# Patient Record
Sex: FEMALE | Race: WHITE | Employment: UNEMPLOYED | URBAN - METROPOLITAN AREA
[De-identification: names, ages, dates, MRNs, and addresses within clinical notes are randomized per-mention and may not be internally consistent; named-entity substitution may affect disease eponyms.]

---

## 2017-05-07 ENCOUNTER — HOSPITAL ENCOUNTER (EMERGENCY)
Age: 42
Discharge: HOME | End: 2017-05-07
Attending: EMERGENCY MEDICINE
Payer: MEDICARE

## 2017-05-07 VITALS
OXYGEN SATURATION: 97 % | SYSTOLIC BLOOD PRESSURE: 149 MMHG | HEART RATE: 76 BPM | BODY MASS INDEX: 38.17 KG/M2 | DIASTOLIC BLOOD PRESSURE: 84 MMHG | RESPIRATION RATE: 16 BRPM | HEIGHT: 67 IN | TEMPERATURE: 97.9 F | WEIGHT: 243.19 LBS

## 2017-05-07 DIAGNOSIS — S01.01XA SCALP LACERATION, INITIAL ENCOUNTER: Primary | ICD-10-CM

## 2017-05-07 PROCEDURE — 99283 EMERGENCY DEPT VISIT LOW MDM: CPT

## 2017-05-07 PROCEDURE — 6370000000 HC RX 637 (ALT 250 FOR IP): Performed by: EMERGENCY MEDICINE

## 2017-05-07 RX ORDER — TIZANIDINE 4 MG/1
4 TABLET ORAL EVERY 6 HOURS PRN
COMMUNITY
End: 2022-07-01

## 2017-05-07 RX ADMIN — Medication: at 15:29

## 2017-05-07 ASSESSMENT — ENCOUNTER SYMPTOMS
COLOR CHANGE: 0
EYE REDNESS: 0
COUGH: 0
EYE DISCHARGE: 0
ABDOMINAL PAIN: 0
FACIAL SWELLING: 0
VOMITING: 0
SHORTNESS OF BREATH: 0
DIARRHEA: 0
CONSTIPATION: 0

## 2017-05-07 ASSESSMENT — PAIN DESCRIPTION - LOCATION: LOCATION: HEAD

## 2017-05-07 ASSESSMENT — PAIN DESCRIPTION - PAIN TYPE: TYPE: ACUTE PAIN

## 2017-05-07 ASSESSMENT — PAIN SCALES - GENERAL: PAINLEVEL_OUTOF10: 1

## 2017-05-09 ENCOUNTER — HOSPITAL ENCOUNTER (EMERGENCY)
Age: 42
Discharge: HOME | End: 2017-05-09
Attending: EMERGENCY MEDICINE
Payer: MEDICARE

## 2017-05-09 ENCOUNTER — APPOINTMENT (OUTPATIENT)
Dept: CT IMAGING | Age: 42
End: 2017-05-09
Payer: MEDICARE

## 2017-05-09 VITALS
BODY MASS INDEX: 38.14 KG/M2 | HEIGHT: 67 IN | OXYGEN SATURATION: 96 % | RESPIRATION RATE: 16 BRPM | WEIGHT: 243 LBS | HEART RATE: 73 BPM | TEMPERATURE: 98.1 F | SYSTOLIC BLOOD PRESSURE: 141 MMHG | DIASTOLIC BLOOD PRESSURE: 88 MMHG

## 2017-05-09 DIAGNOSIS — R42 DIZZINESS: Primary | ICD-10-CM

## 2017-05-09 PROCEDURE — 70450 CT HEAD/BRAIN W/O DYE: CPT

## 2017-05-09 PROCEDURE — 99284 EMERGENCY DEPT VISIT MOD MDM: CPT

## 2017-05-09 ASSESSMENT — ENCOUNTER SYMPTOMS
VOMITING: 0
CONSTIPATION: 0
COLOR CHANGE: 0
EYE DISCHARGE: 0
DIARRHEA: 0
EYE REDNESS: 0
FACIAL SWELLING: 0
ABDOMINAL PAIN: 0
SHORTNESS OF BREATH: 0
COUGH: 0

## 2022-07-01 ENCOUNTER — APPOINTMENT (OUTPATIENT)
Dept: CT IMAGING | Age: 47
End: 2022-07-01
Payer: COMMERCIAL

## 2022-07-01 ENCOUNTER — HOSPITAL ENCOUNTER (EMERGENCY)
Age: 47
Discharge: HOME | End: 2022-07-01
Attending: EMERGENCY MEDICINE
Payer: COMMERCIAL

## 2022-07-01 VITALS
RESPIRATION RATE: 14 BRPM | HEART RATE: 80 BPM | OXYGEN SATURATION: 96 % | DIASTOLIC BLOOD PRESSURE: 94 MMHG | HEIGHT: 67 IN | WEIGHT: 250 LBS | SYSTOLIC BLOOD PRESSURE: 149 MMHG | BODY MASS INDEX: 39.24 KG/M2 | TEMPERATURE: 98.2 F

## 2022-07-01 DIAGNOSIS — G35 MULTIPLE SCLEROSIS EXACERBATION (HCC): Primary | ICD-10-CM

## 2022-07-01 LAB
ABSOLUTE EOS #: 0.1 K/UL (ref 0–0.4)
ABSOLUTE LYMPH #: 1.3 K/UL (ref 1–4.8)
ABSOLUTE MONO #: 0.5 K/UL (ref 0.1–1.2)
ALBUMIN SERPL-MCNC: 4.8 G/DL (ref 3.5–5.2)
ALBUMIN/GLOBULIN RATIO: 1.9 (ref 1–2.5)
ALP BLD-CCNC: 81 U/L (ref 35–104)
ALT SERPL-CCNC: 18 U/L (ref 5–33)
ANION GAP SERPL CALCULATED.3IONS-SCNC: 12 MMOL/L (ref 9–17)
AST SERPL-CCNC: 15 U/L
BASOPHILS # BLD: 1 % (ref 0–2)
BASOPHILS ABSOLUTE: 0 K/UL (ref 0–0.2)
BILIRUB SERPL-MCNC: 0.37 MG/DL (ref 0.3–1.2)
BILIRUBIN DIRECT: 0.09 MG/DL
BILIRUBIN, INDIRECT: 0.28 MG/DL (ref 0–1)
BUN BLDV-MCNC: 14 MG/DL (ref 6–20)
CALCIUM SERPL-MCNC: 9.5 MG/DL (ref 8.6–10.4)
CHLORIDE BLD-SCNC: 100 MMOL/L (ref 98–107)
CO2: 25 MMOL/L (ref 20–31)
CREAT SERPL-MCNC: 0.52 MG/DL (ref 0.5–0.9)
EOSINOPHILS RELATIVE PERCENT: 2 % (ref 1–4)
GFR AFRICAN AMERICAN: >60 ML/MIN
GFR NON-AFRICAN AMERICAN: >60 ML/MIN
GFR SERPL CREATININE-BSD FRML MDRD: ABNORMAL ML/MIN/{1.73_M2}
GLUCOSE BLD-MCNC: 105 MG/DL (ref 70–99)
HCT VFR BLD CALC: 35.9 % (ref 36–46)
HEMOGLOBIN: 11.8 G/DL (ref 12–16)
LYMPHOCYTES # BLD: 21 % (ref 24–44)
MCH RBC QN AUTO: 27.3 PG (ref 26–34)
MCHC RBC AUTO-ENTMCNC: 32.8 G/DL (ref 31–37)
MCV RBC AUTO: 83.5 FL (ref 80–100)
MONOCYTES # BLD: 8 % (ref 2–11)
PDW BLD-RTO: 16 % (ref 12.5–15.4)
PLATELET # BLD: 254 K/UL (ref 140–450)
PMV BLD AUTO: 7.9 FL (ref 6–12)
POTASSIUM SERPL-SCNC: 4.5 MMOL/L (ref 3.7–5.3)
RBC # BLD: 4.3 M/UL (ref 4–5.2)
SEG NEUTROPHILS: 68 % (ref 36–66)
SEGMENTED NEUTROPHILS ABSOLUTE COUNT: 4 K/UL (ref 1.8–7.7)
SODIUM BLD-SCNC: 137 MMOL/L (ref 135–144)
TOTAL PROTEIN: 7.3 G/DL (ref 6.4–8.3)
WBC # BLD: 6 K/UL (ref 3.5–11)

## 2022-07-01 PROCEDURE — 96374 THER/PROPH/DIAG INJ IV PUSH: CPT

## 2022-07-01 PROCEDURE — 85025 COMPLETE CBC W/AUTO DIFF WBC: CPT

## 2022-07-01 PROCEDURE — 36415 COLL VENOUS BLD VENIPUNCTURE: CPT

## 2022-07-01 PROCEDURE — 70450 CT HEAD/BRAIN W/O DYE: CPT

## 2022-07-01 PROCEDURE — 80048 BASIC METABOLIC PNL TOTAL CA: CPT

## 2022-07-01 PROCEDURE — 80076 HEPATIC FUNCTION PANEL: CPT

## 2022-07-01 PROCEDURE — 6360000002 HC RX W HCPCS: Performed by: EMERGENCY MEDICINE

## 2022-07-01 PROCEDURE — 99284 EMERGENCY DEPT VISIT MOD MDM: CPT

## 2022-07-01 RX ORDER — SERTRALINE HYDROCHLORIDE 100 MG/1
150 TABLET, FILM COATED ORAL DAILY
COMMUNITY
Start: 2022-06-08

## 2022-07-01 RX ORDER — LOSARTAN POTASSIUM 25 MG/1
25 TABLET ORAL DAILY
COMMUNITY

## 2022-07-01 RX ORDER — PREDNISONE 10 MG/1
TABLET ORAL
Qty: 48 EACH | Refills: 0 | Status: SHIPPED | OUTPATIENT
Start: 2022-07-01

## 2022-07-01 RX ORDER — BUSPIRONE HYDROCHLORIDE 15 MG/1
15 TABLET ORAL 3 TIMES DAILY
COMMUNITY
Start: 2022-05-18

## 2022-07-01 RX ORDER — DEXAMETHASONE SODIUM PHOSPHATE 4 MG/ML
4 INJECTION, SOLUTION INTRA-ARTICULAR; INTRALESIONAL; INTRAMUSCULAR; INTRAVENOUS; SOFT TISSUE ONCE
Status: COMPLETED | OUTPATIENT
Start: 2022-07-01 | End: 2022-07-01

## 2022-07-01 RX ORDER — OLANZAPINE AND SAMIDORPHAN L-MALATE 5; 10 MG/1; MG/1
5-10 TABLET, FILM COATED ORAL DAILY
COMMUNITY
Start: 2022-06-21

## 2022-07-01 RX ADMIN — DEXAMETHASONE SODIUM PHOSPHATE 4 MG: 4 INJECTION, SOLUTION INTRAMUSCULAR; INTRAVENOUS at 16:14

## 2022-07-01 ASSESSMENT — PAIN - FUNCTIONAL ASSESSMENT: PAIN_FUNCTIONAL_ASSESSMENT: NONE - DENIES PAIN

## 2022-07-01 NOTE — ED PROVIDER NOTES
[address] [facility]  [address] [address] [facility] [address] [facility] [address]  Phone: [phone]  Fax: [phone]        Pt Name: Litzy Hall  MRN: [id number]  [place] 1975  Date of evaluation: 7/1/22      CHIEF COMPLAINT     Chief Complaint   Patient presents with    Numbness     tingling of digits of left hand - left leg numb & dragging -numbness of face- per pt., normal presentation of MS    Dizziness     on-going being treated for vertigo          HISTORY OF PRESENT ILLNESS  (Location/Symptom, Timing/Onset, Context/Setting, Quality, Duration, Modifying Factors, Severity.)    Litzy Hall is a 50 y.o. female who presents with dizziness and numbness. The patient states she has a 20-year history of MS and she has had an MS flareup over the last several days where she has had dizziness numbness that is actually been going on for the last couple of weeks when this happened she normally requires steroids the patient denies any headache no visual or hearing changes no chest pain or shortness of breath no fever no chills no vomiting no diarrhea nothing she does makes her symptoms better or worse      REVIEW OF SYSTEMS    (2-9 systems for level 4, 10 or more for level 5)     Review of Systems   Neurological: Positive for dizziness and numbness. All other systems reviewed and are negative. PAST MEDICAL HISTORY    has a past medical history of Cardiac murmur, previously undiagnosed, Depression, Insomnia, Irregular menses, Migraines, Multiple sclerosis ([address] PMS (premenstrual syndrome), and Vertigo. SURGICAL HISTORY      has a past surgical history that includes Tubal ligation and Fill[place] tooth extraction. CURRENTMEDICATIONS       Previous Medications    BUSPIRONE (BUSPAR) 15 MG TABLET    Take 15 mg by mouth 3 times daily    GLATIRAMER (COPAXONE) 20 MG/ML INJECTION    Inject 40 mg into the skin three times a week.     LOSARTAN (COZAAR) 25 MG TABLET    Take 25 mg by mouth daily    LYBALVI 5-10 MG TABS    Take 5-10 mg by mouth daily    SERTRALINE (ZOLOFT) 100 MG TABLET    Take 150 mg by mouth daily       ALLERGIES     has No Known Allergies. FAMILY HISTORY     She indicated that her mother is alive. She indicated that her father is alive. family history includes Anxiety Disorder in her father and mother; Arthritis in her father; Coronary Art Dis in her father; Diabetes in her mother; High Blood Pressure in her father and mother. SOCIAL HISTORY      reports that she has quit smoking. Her smoking use included cigarettes. She has never used smokeless tobacco. She reports current alcohol use. She reports that she does not use drugs. PHYSICAL EXAM    (up to 7 for level 4, 8 or more for level 5)   INITIAL VITALS:  height is 5' 7\" (1.702 m) and weight is 113.4 kg (250 lb). Her oral temperature is 98.2 °F (36.8 °C). Her blood pressure is 154/95 (abnormal) and her pulse is 81. Her respiration is 12 and oxygen saturation is 97%. Physical Exam  Vitals and nursing note reviewed. Constitutional:       Appearance: Normal appearance. HENT:      Head: Normocephalic and atraumatic. Right Ear: Tympanic membrane normal.      Left Ear: Tympanic membrane normal.   Eyes:      Extraocular Movements: Extraocular movements intact. Conjunctiva/sclera: Conjunctivae normal.      Pupils: Pupils are equal, round, and reactive to light. Cardiovascular:      Rate and Rhythm: Normal rate and regular rhythm. Pulses: Normal pulses. Heart sounds: Normal heart sounds. Pulmonary:      Effort: Pulmonary effort is normal. No respiratory distress. Breath sounds: Normal breath sounds. No stridor. No wheezing, rhonchi or rales. Musculoskeletal:         General: No swelling or tenderness. Normal range of motion. Cervical back: Normal range of motion and neck supple. No rigidity or tenderness. Lymphadenopathy:      Cervical: No cervical adenopathy.    Skin:     General: Skin is warm and dry.      Findings: No rash. Neurological:      General: No focal deficit present. Mental Status: She is alert. Comments: Cranial nerves II through XII are grossly intact with no focal neurologic deficits appreciated no cerebellar deficits with a GCS of 15         DIFFERENTIAL DIAGNOSIS/ MDM:     We will go ahead and check basic labs CT of the head I will give the patient some IV steroids    DIAGNOSTIC RESULTS       RADIOLOGY:        Interpretation per the Radiologist below, if available at the time of this note:    [address] (Final result)  Result time 07/01/22 16:39:12  Final result by Ger Dozier MD (07/01/22 16:39:12)                Impression:    No evidence of acute intracranial process.  Mild atrophy and chronic small   vessel ischemic changes noted along with a small remote infarct of the high   right frontal lobe region. RECOMMENDATIONS:   Unavailable             Narrative:    EXAMINATION:   CT OF THE HEAD WITHOUT CONTRAST  7/1/2022 3:12 pm     TECHNIQUE:   CT of the head was performed without the administration of intravenous   contrast. Automated exposure control, iterative reconstruction, and/or weight   based adjustment of the mA/kV was utilized to reduce the radiation dose to as   low as reasonably achievable. COMPARISON:   May 9, 2017. HISTORY:   ORDERING SYSTEM PROVIDED HISTORY: dizziness   TECHNOLOGIST PROVIDED HISTORY:     dizziness   Decision Support Exception - unselect if not a suspected or confirmed   emergency medical condition->Emergency Medical Condition (MA)   Is the patient pregnant?->No   Reason for Exam: dizziness hx: MS     FINDINGS:   BRAIN/VENTRICLES: The gyri and sulci have a normal appearance.  There is mild   cortical and cerebellar volume loss with associated ex vacuo ventricular   dilation, greater than expected for age. Hulan Burleson diffuse periventricular and   subcortical deep white matter hypoattenuation noted, findings compatible with   chronic small vessel ischemic disease.  Remote infarct of the high right   frontal lobe region is unchanged.  The gray-white matter differentiation is   otherwise preserved throughout. Marcela Section is no acute hemorrhage, mass, or mass   effect.  No evidence of acute territorial infarct.  No abnormal extraaxial   fluid collections. ORBITS:  The visualized portion of the orbits demonstrate no acute   abnormality. SINUSES:  The mastoid air cells are normally aerated.  The visualized   paranasal sinuses are grossly clear. SOFT TISSUES/SKULL:  No significant abnormality of the visualized skull or   soft tissues. No acute fracture. No scalp hematoma.                    LABS:  Results for orders placed or performed during the hospital encounter of 07/01/22   CBC with Auto Differential   Result Value Ref Range    WBC 6.0 3.5 - 11.0 k/uL    RBC 4.30 4.0 - 5.2 m/uL    Hemoglobin 11.8 (L) 12.0 - 16.0 g/dL    Hematocrit 35.9 (L) 36 - 46 %    MCV 83.5 80 - 100 fL    MCH 27.3 26 - 34 pg    MCHC 32.8 31 - 37 g/dL    RDW 16.0 (H) 12.5 - 15.4 %    Platelets 145 168 - 914 k/uL    MPV 7.9 6.0 - 12.0 fL    Seg Neutrophils 68 (H) 36 - 66 %    Lymphocytes 21 (L) 24 - 44 %    Monocytes 8 2 - 11 %    Eosinophils % 2 1 - 4 %    Basophils 1 0 - 2 %    Segs Absolute 4.00 1.8 - 7.7 k/uL    Absolute Lymph # 1.30 1.0 - 4.8 k/uL    Absolute Mono # 0.50 0.1 - 1.2 k/uL    Absolute Eos # 0.10 0.0 - 0.4 k/uL    Basophils Absolute 0.00 0.0 - 0.2 k/uL   Basic Metabolic Panel   Result Value Ref Range    Glucose 105 (H) 70 - 99 mg/dL    BUN 14 6 - 20 mg/dL    CREATININE 0.52 0.50 - 0.90 mg/dL    Calcium 9.5 8.6 - 10.4 mg/dL    Sodium 137 135 - 144 mmol/L    Potassium 4.5 3.7 - 5.3 mmol/L    Chloride 100 98 - 107 mmol/L    CO2 25 20 - 31 mmol/L    Anion Gap 12 9 - 17 mmol/L    GFR Non-African American >60 >60 mL/min    GFR African American >60 >60 mL/min    GFR Comment         Hepatic Function Panel   Result Value Ref Range    Albumin 4.8 3.5 - 5.2 g/dL Alkaline Phosphatase 81 35 - 104 U/L    ALT 18 5 - 33 U/L    AST 15 <32 U/L    Total Bilirubin 0.37 0.3 - 1.2 mg/dL    Bilirubin, Direct 0.09 <0.31 mg/dL    Bilirubin, Indirect 0.28 0.00 - 1.00 mg/dL    Total Protein 7.3 6.4 - 8.3 g/dL    Albumin/Globulin Ratio 1.9 1.0 - 2.5           EMERGENCY DEPARTMENT COURSE:   Vitals:    Vitals:    07/01/22 1535   BP: (!) 154/95   Pulse: 81   Resp: 12   Temp: 98.2 °F (36.8 °C)   TempSrc: Oral   SpO2: 97%   Weight: 113.4 kg (250 lb)   Height: 5' 7\" (1.702 m)     -------------------------  BP: (!) 154/95, Temp: 98.2 °F (36.8 °C), Heart Rate: 81, Resp: 12      RE-EVALUATION:  Lab work and CT are both unremarkable the patient presents with what she describes as a typical MS exacerbation normally requires a prednisone taper so I will go ahead and write for prednisone tablets recommending that she return to the ER for increasing dizziness numbness weakness or other concerns otherwise to follow-up with her family doctor calling on Tuesday for an appointment  At this time the patient is without objective evidence of an acute process requiring hospitalization or inpatient management. They have remained hemodynamically stable throughout their entire ED visit and are stable for discharge with outpatient follow-up. The patient understands that at this time there is no evidence for a more malignant underlying process, but the patient also understands that early in the process of an illness or injury, an emergency department workup can be falsely reassuring. Routine discharge counseling was given, and the patient understands that worsening, changing or persistent symptoms should prompt an immediate call or follow up with their primary physician or return to the emergency department. The importance of appropriate follow up was also discussed. I have reviewed the disposition diagnosis with the patient and or their family/guardian.   I have answered their questions and given discharge instructions. They voiced understanding of these instructions and did not have any further questions or complaints. PROCEDURES:  None    FINAL IMPRESSION      1. Multiple sclerosis exacerbation ([address]          DISPOSITION/PLAN   DISPOSITION        CONDITION ON DISPOSITION:   Stable    PATIENT REFERRED TO:  Cassandra Duncan  [address] [facility] [address]  [phone]    Call in 1 day        DISCHARGE MEDICATIONS:  New Prescriptions    PREDNISONE 10 MG (48) TBPK    5 tablets daily for 3 days then  4 tablets daily for 3 days then  3 tablets daily for 3 days then  2 tablets daily for 3 days then  1 tablet daily for 6 days       (Please note that portions of this note were completed with a voicerecognition program.  Efforts were made to edit the dictations but occasionally words are mis-transcribed.)    Kinsey Gurrola MD,, MD, F.A.C.E.P.   Attending Emergency Medicine Physician       Kinsey Gurrola MD  07/01/22 5610

## 2022-07-01 NOTE — ED NOTES
Pt brought to room via wc. Pt reports experiencing typical indications of a MS flare up-tingling of digits of left hand , face & numbness of LLE w/ foot dragging when walking. Pt also states feeling dizzy- she has recently been treated for vertigo. Pt denies any pain. pts mother is @ bedside. RR are easy/unlabored, A0X4, PWD. Dr. Juma David in too assess pt.       David Hall RN  07/01/22 9683

## 2023-10-03 RX ORDER — LOSARTAN POTASSIUM 25 MG/1
25 TABLET ORAL DAILY
Qty: 30 TABLET | Refills: 2 | Status: SHIPPED | OUTPATIENT
Start: 2023-10-03

## 2023-10-11 RX ORDER — LOSARTAN POTASSIUM 25 MG/1
25 TABLET ORAL DAILY
Qty: 30 TABLET | Refills: 0 | Status: SHIPPED | OUTPATIENT
Start: 2023-10-11

## 2023-10-11 NOTE — TELEPHONE ENCOUNTER
Sent, please call patient to schedule appointment with her PCP for further refills as she hasnt been seen in a year

## 2023-11-02 PROBLEM — I10 ESSENTIAL HYPERTENSION: Status: ACTIVE | Noted: 2023-11-02

## 2023-11-02 NOTE — PROGRESS NOTES
intact. Pupils: Pupils are equal, round, and reactive to light. Cardiovascular:      Rate and Rhythm: Normal rate and regular rhythm. Pulses: Normal pulses. Pulmonary:      Effort: Pulmonary effort is normal.      Breath sounds: Normal breath sounds. Abdominal:      General: Abdomen is protuberant. Palpations: Abdomen is soft. There is no mass. Musculoskeletal:         General: Normal range of motion. Cervical back: Neck supple. No tenderness. Comments: Antalgic gait consistent with MS   Skin:     General: Skin is warm and dry. Neurological:      General: No focal deficit present. Mental Status: She is alert and oriented to person, place, and time. Psychiatric:         Mood and Affect: Mood normal.         Behavior: Behavior normal.         Thought Content: Thought content normal.         Judgment: Judgment normal.                 Please note that parts of this chart were generated using voice recognition Dragon dictation software. Although every effort was made to ensure the accuracy of this automated transcription, some errors in transcription may have occurred.     Electronically signed by Nixon Jacob MD on 11/6/2023 at 11:50 AM

## 2023-11-03 PROBLEM — M19.031 PRIMARY OSTEOARTHRITIS OF RIGHT WRIST: Status: ACTIVE | Noted: 2023-11-03

## 2023-11-03 PROBLEM — F41.8 DEPRESSION WITH ANXIETY: Status: ACTIVE | Noted: 2023-11-03

## 2023-11-03 PROBLEM — F23 BRIEF PSYCHOTIC DISORDER (HCC): Status: ACTIVE | Noted: 2023-11-03

## 2023-11-06 ENCOUNTER — OFFICE VISIT (OUTPATIENT)
Age: 48
End: 2023-11-06
Payer: COMMERCIAL

## 2023-11-06 VITALS
RESPIRATION RATE: 16 BRPM | TEMPERATURE: 98 F | HEART RATE: 68 BPM | SYSTOLIC BLOOD PRESSURE: 144 MMHG | HEIGHT: 67 IN | BODY MASS INDEX: 44.26 KG/M2 | DIASTOLIC BLOOD PRESSURE: 78 MMHG | WEIGHT: 282 LBS

## 2023-11-06 DIAGNOSIS — Z01.419 WELL WOMAN EXAM: ICD-10-CM

## 2023-11-06 DIAGNOSIS — N39.41 URGE INCONTINENCE: ICD-10-CM

## 2023-11-06 DIAGNOSIS — I10 ESSENTIAL HYPERTENSION: ICD-10-CM

## 2023-11-06 DIAGNOSIS — E78.00 HYPERCHOLESTEREMIA: ICD-10-CM

## 2023-11-06 DIAGNOSIS — F23 BRIEF PSYCHOTIC DISORDER (HCC): ICD-10-CM

## 2023-11-06 DIAGNOSIS — Z12.11 SCREEN FOR COLON CANCER: ICD-10-CM

## 2023-11-06 DIAGNOSIS — M19.031 PRIMARY OSTEOARTHRITIS OF RIGHT WRIST: ICD-10-CM

## 2023-11-06 DIAGNOSIS — F41.8 DEPRESSION WITH ANXIETY: ICD-10-CM

## 2023-11-06 DIAGNOSIS — E66.01 CLASS 3 SEVERE OBESITY DUE TO EXCESS CALORIES WITH SERIOUS COMORBIDITY AND BODY MASS INDEX (BMI) OF 40.0 TO 44.9 IN ADULT (HCC): ICD-10-CM

## 2023-11-06 DIAGNOSIS — Z87.891 FORMER SMOKER: Primary | ICD-10-CM

## 2023-11-06 PROCEDURE — 3077F SYST BP >= 140 MM HG: CPT | Performed by: FAMILY MEDICINE

## 2023-11-06 PROCEDURE — 99396 PREV VISIT EST AGE 40-64: CPT | Performed by: FAMILY MEDICINE

## 2023-11-06 PROCEDURE — 99212 OFFICE O/P EST SF 10 MIN: CPT | Performed by: FAMILY MEDICINE

## 2023-11-06 PROCEDURE — 3078F DIAST BP <80 MM HG: CPT | Performed by: FAMILY MEDICINE

## 2023-11-06 RX ORDER — VENLAFAXINE HYDROCHLORIDE 75 MG/1
CAPSULE, EXTENDED RELEASE ORAL
COMMUNITY
Start: 2023-11-03

## 2023-11-06 RX ORDER — TIZANIDINE 2 MG/1
TABLET ORAL
COMMUNITY
Start: 2023-10-17

## 2023-11-06 RX ORDER — OXYBUTYNIN CHLORIDE 5 MG/1
5 TABLET, EXTENDED RELEASE ORAL DAILY
Qty: 30 TABLET | Refills: 0 | Status: SHIPPED | OUTPATIENT
Start: 2023-11-06

## 2023-11-06 ASSESSMENT — ENCOUNTER SYMPTOMS
VOMITING: 0
CONSTIPATION: 0
CHEST TIGHTNESS: 0
ABDOMINAL PAIN: 0
BLOOD IN STOOL: 0
DIARRHEA: 0
COUGH: 0

## 2023-11-06 NOTE — PATIENT INSTRUCTIONS
I would recommend a diet focused on vegetables, fruits, health protein that minimizes sweets, watch more than 1 portion of carbohydrates in a meal, avoid high sugar beverages and excess red meats. Maintain a healthy BMI to 30. Lets start with doing a food diary 2 days when you are working and 1 day when you are off. This will help us see what foods you like and the amount of food you are eating. It could be that your psych medications are causing you to eat more but we will start with the food diary. Glad that you are doing regular aerobic exercise. Keep up the good work on your exercise bike. Also added in the routine that your physical therapy group have given you to do at home. You will be due for repeat Pap in 2025, and you will be due for mammogram after the first of the year. I have also given you a referral for your colonoscopy when you are ready. Lets start with the oxybutynin once a day since you are only having problems at night. If you find that you need it more than once a day, let me know and I will increase the amount. I am being cautious with this because it can elevate your blood pressure. I will see you back in 3 months. Have your labs done at your earliest convenience. You should fast 6-8 hours you can have as much water as you would like, no candy, food or gum. You may drink coffee black.

## 2023-11-20 DIAGNOSIS — E78.00 HYPERCHOLESTEREMIA: ICD-10-CM

## 2023-11-20 DIAGNOSIS — I10 ESSENTIAL HYPERTENSION: ICD-10-CM

## 2023-12-06 RX ORDER — LOSARTAN POTASSIUM 25 MG/1
25 TABLET ORAL DAILY
Qty: 28 TABLET | Refills: 2 | Status: SHIPPED | OUTPATIENT
Start: 2023-12-06

## 2023-12-13 DIAGNOSIS — N39.41 URGE INCONTINENCE: ICD-10-CM

## 2023-12-13 RX ORDER — OXYBUTYNIN CHLORIDE 5 MG/1
5 TABLET, EXTENDED RELEASE ORAL DAILY
Qty: 90 TABLET | Refills: 1 | Status: SHIPPED | OUTPATIENT
Start: 2023-12-13

## 2024-01-22 ENCOUNTER — TELEPHONE (OUTPATIENT)
Age: 49
End: 2024-01-22

## 2024-01-22 RX ORDER — OXYBUTYNIN CHLORIDE 5 MG/1
TABLET ORAL
Qty: 180 TABLET | Refills: 1 | Status: SHIPPED | OUTPATIENT
Start: 2024-01-22

## 2024-01-22 NOTE — TELEPHONE ENCOUNTER
Patient called and wanted to know if it is ok for her to increase her oxybutynin. She stated that she is currently on 5 mg daily and that she has been daily 10 mg daily and wanted to know if this was ok by you. Please send a new script of the increased dosage to the [facility] on [facility].

## 2024-02-14 PROBLEM — Z87.891 FORMER CIGARETTE SMOKER: Status: ACTIVE | Noted: 2024-02-14

## 2024-02-14 NOTE — PROGRESS NOTES
[facility]  [address] [place] [address]  Phone: [phone]  Fax: [phone]      Date of Visit:  2/15/2024  Patient Name: Gracie Lopez   Patient :  1975     Assessment:     1. Essential hypertension    2. Hypercholesteremia    3. Class 3 severe obesity due to excess calories with serious comorbidity and body mass index (BMI) of 40.0 to 44.9 in adult (HCC)    4. Multiple sclerosis (HCC)    5. Former cigarette smoker    6. Screen for colon cancer        Plan:    BMI was elevated today, and weight loss plan recommended is : conventional weight loss.     She will continue to work on diet and exercise.    Dealing with anxiety right now we will see how her blood pressures are running at home but will probably need to increase blood pressure medication.  She will be getting me 6 values over the next 2 weeks and we will adjust blood pressure medication depending on those values.  She will be seeing psychiatry in the near future which will help with the anxiety.           Patient Instructions   Great to see you today.  Glad that you have put together the food diary and are working on \"low hanging fruit\".  If you cut the amount of pop in half that will make a big difference.    At the moment we will hold off on adding cholesterol medication; however, we will work on your blood pressure.  I am going to add something on low-salt.  The easy way to see if you are salt sensitive is to only eat things that are 200 mg of sodium or less per serving for approximately 2 weeks.  That will be equivalent to a 2 g salt diet.  The average American eats 9 g of salt a day.  Some people are very sensitive to this.  The DASH diet is basically fresh fruit and vegetables which avoids the sodium and preservatives.    I am also going to give you a handout on home blood pressure as well as checking the website:  https://www.validatebp.org/ which has good

## 2024-02-15 ENCOUNTER — OFFICE VISIT (OUTPATIENT)
Age: 49
End: 2024-02-15
Payer: MEDICARE

## 2024-02-15 VITALS
WEIGHT: 284 LBS | HEART RATE: 69 BPM | SYSTOLIC BLOOD PRESSURE: 153 MMHG | RESPIRATION RATE: 18 BRPM | HEIGHT: 67 IN | DIASTOLIC BLOOD PRESSURE: 92 MMHG | BODY MASS INDEX: 44.57 KG/M2

## 2024-02-15 DIAGNOSIS — E66.01 CLASS 3 SEVERE OBESITY DUE TO EXCESS CALORIES WITH SERIOUS COMORBIDITY AND BODY MASS INDEX (BMI) OF 40.0 TO 44.9 IN ADULT (HCC): ICD-10-CM

## 2024-02-15 DIAGNOSIS — G35 MULTIPLE SCLEROSIS (HCC): ICD-10-CM

## 2024-02-15 DIAGNOSIS — I10 ESSENTIAL HYPERTENSION: Primary | ICD-10-CM

## 2024-02-15 DIAGNOSIS — E78.00 HYPERCHOLESTEREMIA: ICD-10-CM

## 2024-02-15 DIAGNOSIS — Z87.891 FORMER CIGARETTE SMOKER: ICD-10-CM

## 2024-02-15 DIAGNOSIS — Z12.11 SCREEN FOR COLON CANCER: ICD-10-CM

## 2024-02-15 PROCEDURE — 99213 OFFICE O/P EST LOW 20 MIN: CPT | Performed by: FAMILY MEDICINE

## 2024-02-15 RX ORDER — OXYBUTYNIN CHLORIDE 5 MG/1
TABLET ORAL
Qty: 180 TABLET | Refills: 1 | Status: SHIPPED | OUTPATIENT
Start: 2024-02-15

## 2024-02-15 RX ORDER — GLATIRAMER 40 MG/ML
40 INJECTION, SOLUTION SUBCUTANEOUS
COMMUNITY
Start: 2024-01-18

## 2024-02-15 NOTE — PATIENT INSTRUCTIONS
Great to see you today.  Glad that you have put together the food diary and are working on \"low hanging fruit\".  If you cut the amount of pop in half that will make a big difference.    At the moment we will hold off on adding cholesterol medication; however, we will work on your blood pressure.  I am going to add something on low-salt.  The easy way to see if you are salt sensitive is to only eat things that are 200 mg of sodium or less per serving for approximately 2 weeks.  That will be equivalent to a 2 g salt diet.  The average American eats 9 g of salt a day.  Some people are very sensitive to this.  The DASH diet is basically fresh fruit and vegetables which avoids the sodium and preservatives.    I am also going to give you a handout on home blood pressure as well as checking the website:  https://www.validatebp.org/ which has good information on blood pressure machines.    For exercise, continue with your exercise bike and the routine that you have from physical therapy.    I have also added a Cologuard which will come to you in the mail just follow the directions on this.  If it is negative you do not need a colonoscopy and we will recheck again in 2 years.    I will see you back in 3 months

## 2024-02-29 RX ORDER — LOSARTAN POTASSIUM 25 MG/1
25 TABLET ORAL DAILY
Qty: 90 TABLET | Refills: 2 | Status: SHIPPED | OUTPATIENT
Start: 2024-02-29

## 2024-03-03 NOTE — PATIENT INSTRUCTIONS
Thank for coming in today, Gracie.  It was a pleasure to meet you.  As discussed, we are increasing your losartan to 50 mg daily.  Start taking this increased dose today.  If after 2 weeks you are still noticing systolic blood pressures above 140 and diastolics above 90 (top and bottom numbers respectively) 1 received in the office, we will increase to 100 mg.  Please get your lab work done today.  There is also a repeat basic metabolic panel (BMP) order that I would like to have done in 2 weeks.  I have also ordered an echocardiogram which is an ultrasound of your heart.  You should have this scheduled and should receive a call to have done.  Please follow-up in 2 weeks.

## 2024-03-04 ENCOUNTER — OFFICE VISIT (OUTPATIENT)
Age: 49
End: 2024-03-04
Payer: MEDICARE

## 2024-03-04 VITALS
DIASTOLIC BLOOD PRESSURE: 103 MMHG | WEIGHT: 279.8 LBS | RESPIRATION RATE: 18 BRPM | HEART RATE: 78 BPM | BODY MASS INDEX: 43.92 KG/M2 | HEIGHT: 67 IN | SYSTOLIC BLOOD PRESSURE: 164 MMHG | TEMPERATURE: 97.6 F

## 2024-03-04 DIAGNOSIS — E66.01 CLASS 3 SEVERE OBESITY DUE TO EXCESS CALORIES WITHOUT SERIOUS COMORBIDITY WITH BODY MASS INDEX (BMI) OF 40.0 TO 44.9 IN ADULT (HCC): ICD-10-CM

## 2024-03-04 DIAGNOSIS — I10 ESSENTIAL HYPERTENSION: Primary | ICD-10-CM

## 2024-03-04 DIAGNOSIS — R73.01 IMPAIRED FASTING GLUCOSE: ICD-10-CM

## 2024-03-04 DIAGNOSIS — Z87.891 FORMER CIGARETTE SMOKER: ICD-10-CM

## 2024-03-04 DIAGNOSIS — R01.1 HEART MURMUR: ICD-10-CM

## 2024-03-04 LAB
ESTIMATED AVERAGE GLUCOSE: 123
HBA1C MFR BLD: 5.9 %

## 2024-03-04 PROCEDURE — 99214 OFFICE O/P EST MOD 30 MIN: CPT | Performed by: STUDENT IN AN ORGANIZED HEALTH CARE EDUCATION/TRAINING PROGRAM

## 2024-03-04 PROCEDURE — 99212 OFFICE O/P EST SF 10 MIN: CPT | Performed by: STUDENT IN AN ORGANIZED HEALTH CARE EDUCATION/TRAINING PROGRAM

## 2024-03-04 PROCEDURE — 3077F SYST BP >= 140 MM HG: CPT | Performed by: STUDENT IN AN ORGANIZED HEALTH CARE EDUCATION/TRAINING PROGRAM

## 2024-03-04 PROCEDURE — 3080F DIAST BP >= 90 MM HG: CPT | Performed by: STUDENT IN AN ORGANIZED HEALTH CARE EDUCATION/TRAINING PROGRAM

## 2024-03-04 RX ORDER — LOSARTAN POTASSIUM 50 MG/1
50 TABLET ORAL DAILY
Qty: 30 TABLET | Refills: 1 | Status: SHIPPED | OUTPATIENT
Start: 2024-03-04 | End: 2024-03-21

## 2024-03-04 NOTE — PROGRESS NOTES
[facility]  [address] [place] [address]  Phone: [phone]  Fax: [phone]      Date of Visit:  3/3/2024  Patient Name: Gracie Lopez   Patient :  1975     Assessment:     No diagnosis found.    Plan:    {BMI Screening Follow-up Plan:83424}     The patient does have impaired fasting glucose which a hemoglobin A1c should be checked in the future.    We will continue to work on lifestyle changes as previously outlined.    We will locate former echocardiogram since she does have a history of hypertension as well as cardiac murmur       There are no Patient Instructions on file for this visit.     Requested Prescriptions      No prescriptions requested or ordered in this encounter       There are no discontinued medications.    No follow-ups on file.    Gracie was given educational materials: See patient instructions. Discussed use, benefit, and side effects of prescribed medications.  Barriers to medication compliance addressed. All patient questions answered.  Pt voiced understanding.     Subjective:      HPI    Gracie Lopez is a 48 y.o. female with Hx of  has a past medical history of Anxiety, Cardiac murmur, previously undiagnosed, Cervical dysplasia, Depression, Effusion of acromioclavicular joint, Epidermoid cyst of skin, Fatigue, Hypercholesteremia, Hyperlipidemia, Hypertension, Insomnia, Irregular menses, IT band syndrome, Labyrinthitis, Migraines, Multiple sclerosis (HCC), Muscle spasm, Obesity, Panic disorder without agoraphobia, PMS (premenstrual syndrome), Post concussion syndrome, Swelling of joint of left shoulder, Tendonitis, and Vertigo. who presents to clinic with due to No chief complaint on file.        Patient was last seen by myself on 2/15/2024 at that time we reviewed her essential hypertension, hypercholesterolemia, multiple sclerosis and did annual screening..  We also discussed lifestyle changes 
injection Inject 1 mL into the skin three times a week      Blood Pressure Monitoring (BLOOD PRESSURE MONITOR/M CUFF) MISC 1 Units by Does not apply route once a week 1 each 0    venlafaxine (EFFEXOR XR) 75 MG extended release capsule 1 capsule      tiZANidine (ZANAFLEX) 2 MG tablet Take 1 tablet by mouth every 6 hours as needed      busPIRone (BUSPAR) 15 MG tablet Take 15 mg by mouth 3 times daily      LYBALVI 5-10 MG TABS Take 5-10 mg by mouth daily      oxyBUTYnin (DITROPAN) 5 MG tablet TAKE 2 TABLETS BY MOUTH TWICE A  tablet 1     No current facility-administered medications for this visit.        Allergies   Allergen Reactions    Baclofen      Unknown reaction         Past Medical History:   Diagnosis Date    Anxiety     Cardiac murmur, previously undiagnosed     Cervical dysplasia     Depression     Effusion of acromioclavicular joint     Epidermoid cyst of skin     Fatigue     Hypercholesteremia     Hyperlipidemia     Hypertension     Insomnia     Irregular menses     IT band syndrome     Labyrinthitis     Migraines     Multiple sclerosis (HCC)     Muscle spasm     Obesity     Panic disorder without agoraphobia     PMS (premenstrual syndrome)     Post concussion syndrome     Swelling of joint of left shoulder     Tendonitis     rotator cuff    Vertigo        Past Surgical History:   Procedure Laterality Date    TUBAL LIGATION      WISDOM TOOTH EXTRACTION          Social History     Socioeconomic History    Marital status:      Spouse name: None    Number of children: None    Years of education: None    Highest education level: None   Tobacco Use    Smoking status: Former     Current packs/day: 1.00     Average packs/day: 1 pack/day for 30.0 years (30.0 ttl pk-yrs)     Types: Cigarettes    Smokeless tobacco: Never    Tobacco comments:     quit 2 years ago   Substance and Sexual Activity    Alcohol use: Yes     Comment: occ    Drug use: No    Sexual activity: Yes     Social Determinants of Health

## 2024-03-05 DIAGNOSIS — R73.01 IMPAIRED FASTING GLUCOSE: ICD-10-CM

## 2024-03-05 DIAGNOSIS — I10 ESSENTIAL HYPERTENSION: ICD-10-CM

## 2024-03-08 ENCOUNTER — HOSPITAL ENCOUNTER (OUTPATIENT)
Age: 49
End: 2024-03-08
Payer: MEDICARE

## 2024-03-08 DIAGNOSIS — I10 ESSENTIAL HYPERTENSION: ICD-10-CM

## 2024-03-08 DIAGNOSIS — R01.1 HEART MURMUR: ICD-10-CM

## 2024-03-08 LAB
ECHO AO ASC DIAM: 3.8 CM
ECHO AO ROOT DIAM: 2.8 CM
ECHO AV AREA PEAK VELOCITY: 1.8 CM2
ECHO AV AREA VTI: 2.9 CM2
ECHO AV MEAN GRADIENT: 3 MMHG
ECHO AV MEAN VELOCITY: 0.8 M/S
ECHO AV PEAK GRADIENT: 5 MMHG
ECHO AV PEAK VELOCITY: 1.2 M/S
ECHO AV VELOCITY RATIO: 0.58
ECHO AV VTI: 23.1 CM
ECHO IVC PROX: 1.9 CM
ECHO LA DIAMETER: 4.3 CM
ECHO LA TO AORTIC ROOT RATIO: 1.54
ECHO LV E' LATERAL VELOCITY: 13 CM/S
ECHO LV E' SEPTAL VELOCITY: 6 CM/S
ECHO LV FRACTIONAL SHORTENING: 23 % (ref 28–44)
ECHO LV INTERNAL DIMENSION DIASTOLIC: 4.8 CM (ref 3.9–5.3)
ECHO LV INTERNAL DIMENSION SYSTOLIC: 3.7 CM
ECHO LV IVSD: 1.8 CM (ref 0.6–0.9)
ECHO LV MASS 2D: 303.4 G (ref 67–162)
ECHO LV POSTERIOR WALL DIASTOLIC: 1.2 CM (ref 0.6–0.9)
ECHO LV RELATIVE WALL THICKNESS RATIO: 0.5
ECHO LVOT AREA: 3.1 CM2
ECHO LVOT AV VTI INDEX: 0.93
ECHO LVOT DIAM: 2 CM
ECHO LVOT MEAN GRADIENT: 2 MMHG
ECHO LVOT PEAK GRADIENT: 2 MMHG
ECHO LVOT PEAK VELOCITY: 0.7 M/S
ECHO LVOT SV: 67.5 ML
ECHO LVOT VTI: 21.5 CM
ECHO MV A VELOCITY: 0.58 M/S
ECHO MV AREA VTI: 2.9 CM2
ECHO MV E DECELERATION TIME (DT): 188 MS
ECHO MV E VELOCITY: 0.65 M/S
ECHO MV E/A RATIO: 1.12
ECHO MV E/E' LATERAL: 5
ECHO MV E/E' RATIO (AVERAGED): 7.92
ECHO MV LVOT VTI INDEX: 1.09
ECHO MV MAX VELOCITY: 0.8 M/S
ECHO MV MEAN GRADIENT: 1 MMHG
ECHO MV MEAN VELOCITY: 0.5 M/S
ECHO MV PEAK GRADIENT: 3 MMHG
ECHO MV VTI: 23.4 CM
ECHO RV FREE WALL PEAK S': 14 CM/S
ECHO RV TAPSE: 2.4 CM (ref 1.7–?)

## 2024-03-08 PROCEDURE — 93306 TTE W/DOPPLER COMPLETE: CPT

## 2024-03-10 LAB — NONINV COLON CA DNA+OCC BLD SCRN STL QL: POSITIVE

## 2024-03-15 DIAGNOSIS — I42.2 ASYMMETRIC SEPTAL HYPERTROPHY (HCC): Primary | ICD-10-CM

## 2024-03-15 RX ORDER — OXYBUTYNIN CHLORIDE 5 MG/1
TABLET ORAL
Qty: 112 TABLET | Refills: 1 | Status: SHIPPED | OUTPATIENT
Start: 2024-03-15

## 2024-03-18 DIAGNOSIS — I10 ESSENTIAL HYPERTENSION: ICD-10-CM

## 2024-03-19 RX ORDER — VENLAFAXINE HYDROCHLORIDE 37.5 MG/1
37.5 CAPSULE, EXTENDED RELEASE ORAL DAILY
COMMUNITY
Start: 2024-03-15

## 2024-03-19 RX ORDER — BUSPIRONE HYDROCHLORIDE 30 MG/1
30 TABLET ORAL DAILY
COMMUNITY
Start: 2024-03-14

## 2024-03-19 ASSESSMENT — ENCOUNTER SYMPTOMS
ABDOMINAL PAIN: 0
DIARRHEA: 0
CHEST TIGHTNESS: 0
BLOOD IN STOOL: 0
COUGH: 0
VOMITING: 0
CONSTIPATION: 0

## 2024-03-19 NOTE — PROGRESS NOTES
Medical History:   Diagnosis Date    Anxiety     Cardiac murmur, previously undiagnosed     Cervical dysplasia     Depression     Effusion of acromioclavicular joint     Epidermoid cyst of skin     Fatigue     Hypercholesteremia     Hyperlipidemia     Hypertension     Insomnia     Irregular menses     IT band syndrome     Labyrinthitis     Migraines     Multiple sclerosis (HCC)     Muscle spasm     Obesity     Panic disorder without agoraphobia     PMS (premenstrual syndrome)     Post concussion syndrome     Swelling of joint of left shoulder     Tendonitis     rotator cuff    Vertigo        Past Surgical History:   Procedure Laterality Date    TUBAL LIGATION      WISDOM TOOTH EXTRACTION          Social History     Socioeconomic History    Marital status:      Spouse name: None    Number of children: None    Years of education: None    Highest education level: None   Tobacco Use    Smoking status: Former     Current packs/day: 1.00     Average packs/day: 1 pack/day for 30.0 years (30.0 ttl pk-yrs)     Types: Cigarettes    Smokeless tobacco: Never    Tobacco comments:     quit 2 years ago   Substance and Sexual Activity    Alcohol use: Yes     Comment: occ    Drug use: No    Sexual activity: Yes     Social Determinants of Health     Financial Resource Strain: Low Risk  (2/15/2024)    Overall Financial Resource Strain (CARDIA)     Difficulty of Paying Living Expenses: Not hard at all   Food Insecurity: No Food Insecurity (2/15/2024)    Hunger Vital Sign     Worried About Running Out of Food in the Last Year: Never true     Ran Out of Food in the Last Year: Never true   Transportation Needs: Unknown (2/15/2024)    PRAPARE - Transportation     Lack of Transportation (Non-Medical): No   Housing Stability: Unknown (2/15/2024)    Housing Stability Vital Sign     Unstable Housing in the Last Year: No        Objective:      BP (!) 151/86   Pulse 78   Resp 16   Wt 125.6 kg (277 lb)   BMI 43.38 kg/m²    BP Readings

## 2024-03-21 ENCOUNTER — OFFICE VISIT (OUTPATIENT)
Age: 49
End: 2024-03-21

## 2024-03-21 VITALS
RESPIRATION RATE: 16 BRPM | WEIGHT: 277 LBS | SYSTOLIC BLOOD PRESSURE: 151 MMHG | DIASTOLIC BLOOD PRESSURE: 86 MMHG | BODY MASS INDEX: 43.38 KG/M2 | HEART RATE: 78 BPM

## 2024-03-21 DIAGNOSIS — Z12.31 ENCOUNTER FOR SCREENING MAMMOGRAM FOR MALIGNANT NEOPLASM OF BREAST: ICD-10-CM

## 2024-03-21 DIAGNOSIS — I10 ESSENTIAL HYPERTENSION: ICD-10-CM

## 2024-03-21 DIAGNOSIS — Z12.39 SCREENING BREAST EXAMINATION: ICD-10-CM

## 2024-03-21 DIAGNOSIS — Z12.11 SCREEN FOR COLON CANCER: Primary | ICD-10-CM

## 2024-03-21 RX ORDER — LOSARTAN POTASSIUM 100 MG/1
100 TABLET ORAL DAILY
Qty: 30 TABLET | Refills: 3 | Status: SHIPPED | OUTPATIENT
Start: 2024-03-21

## 2024-03-21 NOTE — PATIENT INSTRUCTIONS
Glad to see that you are gradually losing weight.  Keep up the good work along with your exercise plan and setting of realistic goals.  Be sure to reward yourself as you continue to keep on your lifestyle changes.    I would continue to recommend a diet focused on vegetables, fruits, health protein that minimizes sweets, watch more than 1 portion of carbohydrates in a meal, avoid high sugar beverages and excess red meats.        We will increase your losartan to 100 mg a day.  For now we will take 2, 50 mg tablets at the same time each day.  I did put a new prescription in at the drugstore for the 100 mg.  Continue to keep track of your blood pressure as you have been doing.  The goal is to get you at 140/90 or less consistently.    We will schedule you and Bertin for 3 months from now

## 2024-03-26 ENCOUNTER — PATIENT MESSAGE (OUTPATIENT)
Age: 49
End: 2024-03-26

## 2024-03-26 RX ORDER — LOSARTAN POTASSIUM 25 MG/1
25 TABLET ORAL DAILY
Qty: 30 TABLET | Refills: 1 | Status: SHIPPED | OUTPATIENT
Start: 2024-03-26

## 2024-03-26 NOTE — TELEPHONE ENCOUNTER
From: Gracie Lopez  To: Dr. Ignacio Babcock  Sent: 3/26/2024 8:45 AM EDT  Subject: Losartan     Yesterday i became dizzy, lightheaded, sweaty and i dont know if it was the increase in losartan. You raised it to 100 but im only taking 75mg right now because thats what i had on hand. I also thought it could have just been bad anxiety

## 2024-04-09 ENCOUNTER — HOSPITAL ENCOUNTER (OUTPATIENT)
Dept: MAMMOGRAPHY | Age: 49
Discharge: HOME | End: 2024-04-11
Payer: MEDICARE

## 2024-04-09 VITALS — HEIGHT: 67 IN | BODY MASS INDEX: 43.95 KG/M2 | WEIGHT: 280 LBS

## 2024-04-09 DIAGNOSIS — Z12.31 ENCOUNTER FOR SCREENING MAMMOGRAM FOR MALIGNANT NEOPLASM OF BREAST: ICD-10-CM

## 2024-04-09 PROCEDURE — 77063 BREAST TOMOSYNTHESIS BI: CPT

## 2024-04-10 ENCOUNTER — PATIENT MESSAGE (OUTPATIENT)
Age: 49
End: 2024-04-10

## 2024-04-11 RX ORDER — LOSARTAN POTASSIUM 50 MG/1
50 TABLET ORAL DAILY
Qty: 90 TABLET | Refills: 1 | Status: SHIPPED | OUTPATIENT
Start: 2024-04-11

## 2024-04-11 RX ORDER — LOSARTAN POTASSIUM 25 MG/1
25 TABLET ORAL DAILY
Qty: 90 TABLET | Refills: 1 | Status: SHIPPED | OUTPATIENT
Start: 2024-04-11

## 2024-04-11 NOTE — TELEPHONE ENCOUNTER
From: Gracie Lopez  To: Dr. Ignacio Babcock  Sent: 4/10/2024 7:07 PM EDT  Subject: Mammogram    [facility]: Encounter For Screening Mammogram For Malignant Neoplasm Of Breast.  What does this mean?  My blood pressure is better, the last 3 days have been 135/77 consistently.

## 2024-05-13 RX ORDER — OXYBUTYNIN CHLORIDE 10 MG/1
10 TABLET, EXTENDED RELEASE ORAL DAILY
Qty: 90 TABLET | Refills: 1 | Status: SHIPPED | OUTPATIENT
Start: 2024-05-13

## 2024-05-23 NOTE — PROGRESS NOTES
[facility]  [address] [place] [address]  Phone: [phone]  Fax: [phone]      Date of Visit:  6/10/2024  Patient Name: Gracie Lopez   Patient :  1975     Assessment:     1. Essential hypertension    2. Multiple sclerosis (HCC)    3. Former cigarette smoker    4. Class 3 severe obesity due to excess calories without serious comorbidity with body mass index (BMI) of 40.0 to 44.9 in adult (HCC)    5. Pure hypercholesterolemia    6. Urge incontinence        Plan:    BMI was elevated today, and weight loss plan recommended is : conventional weight loss.     Patient continues to work on her weight loss plan.  See below.       Patient Instructions   Glad to see that you have been gradually losing weight.  Keep up the good work along with your exercise plan and setting of realistic goals.  Be sure to reward yourself as you continue to keep on your lifestyle changes.     I would continue to recommend a diet focused on vegetables, fruits, health protein that minimizes sweets, watch more than 1 portion of carbohydrates in a meal, avoid high sugar beverages and excess red meats.        Please stop drinking fluids at least an hour or 2 before bedtime.  I have also asked you to double up on the oxybutynin but before you do that see if the symptoms are decreased by decreasing your fluid intake.    I also have added the Lipitor for your cholesterol.  If you have any problems, let me know and I will see you back in 6 months        Requested Prescriptions     Signed Prescriptions Disp Refills    oxyBUTYnin (DITROPAN XL) 10 MG extended release tablet 180 tablet 1     Sig: Take 2 tablets by mouth daily    atorvastatin (LIPITOR) 20 MG tablet 90 tablet 0     Sig: Take 1 tablet by mouth daily       Medications Discontinued During This Encounter   Medication Reason    oxyBUTYnin (DITROPAN XL) 10 MG extended release tablet REORDER       Return in

## 2024-06-10 ENCOUNTER — OFFICE VISIT (OUTPATIENT)
Age: 49
End: 2024-06-10
Payer: MEDICARE

## 2024-06-10 VITALS
DIASTOLIC BLOOD PRESSURE: 64 MMHG | WEIGHT: 282 LBS | SYSTOLIC BLOOD PRESSURE: 123 MMHG | HEART RATE: 70 BPM | BODY MASS INDEX: 44.17 KG/M2 | RESPIRATION RATE: 16 BRPM

## 2024-06-10 DIAGNOSIS — I10 ESSENTIAL HYPERTENSION: Primary | ICD-10-CM

## 2024-06-10 DIAGNOSIS — G35 MULTIPLE SCLEROSIS (HCC): ICD-10-CM

## 2024-06-10 DIAGNOSIS — Z87.891 FORMER CIGARETTE SMOKER: ICD-10-CM

## 2024-06-10 DIAGNOSIS — E66.01 CLASS 3 SEVERE OBESITY DUE TO EXCESS CALORIES WITHOUT SERIOUS COMORBIDITY WITH BODY MASS INDEX (BMI) OF 40.0 TO 44.9 IN ADULT (HCC): ICD-10-CM

## 2024-06-10 DIAGNOSIS — E78.00 PURE HYPERCHOLESTEROLEMIA: ICD-10-CM

## 2024-06-10 DIAGNOSIS — N39.41 URGE INCONTINENCE: ICD-10-CM

## 2024-06-10 PROCEDURE — 3078F DIAST BP <80 MM HG: CPT | Performed by: FAMILY MEDICINE

## 2024-06-10 PROCEDURE — 99214 OFFICE O/P EST MOD 30 MIN: CPT | Performed by: FAMILY MEDICINE

## 2024-06-10 PROCEDURE — 3074F SYST BP LT 130 MM HG: CPT | Performed by: FAMILY MEDICINE

## 2024-06-10 RX ORDER — OXYBUTYNIN CHLORIDE 10 MG/1
20 TABLET, EXTENDED RELEASE ORAL DAILY
Qty: 180 TABLET | Refills: 1
Start: 2024-06-10

## 2024-06-10 RX ORDER — ATORVASTATIN CALCIUM 20 MG/1
20 TABLET, FILM COATED ORAL DAILY
Qty: 90 TABLET | Refills: 0 | Status: SHIPPED | OUTPATIENT
Start: 2024-06-10

## 2024-06-10 ASSESSMENT — ENCOUNTER SYMPTOMS
COUGH: 0
VOMITING: 0
CHEST TIGHTNESS: 0
DIARRHEA: 0
BLOOD IN STOOL: 0
ABDOMINAL PAIN: 0
CONSTIPATION: 0

## 2024-06-10 NOTE — PATIENT INSTRUCTIONS
Glad to see that you have been gradually losing weight.  Keep up the good work along with your exercise plan and setting of realistic goals.  Be sure to reward yourself as you continue to keep on your lifestyle changes.     I would continue to recommend a diet focused on vegetables, fruits, health protein that minimizes sweets, watch more than 1 portion of carbohydrates in a meal, avoid high sugar beverages and excess red meats.        Please stop drinking fluids at least an hour or 2 before bedtime.  I have also asked you to double up on the oxybutynin but before you do that see if the symptoms are decreased by decreasing your fluid intake.    I also have added the Lipitor for your cholesterol.  If you have any problems, let me know and I will see you back in 6 months

## 2024-09-02 ENCOUNTER — HOSPITAL ENCOUNTER (EMERGENCY)
Age: 49
Discharge: HOME | End: 2024-09-02
Payer: COMMERCIAL

## 2024-09-02 VITALS
SYSTOLIC BLOOD PRESSURE: 158 MMHG | RESPIRATION RATE: 16 BRPM | BODY MASS INDEX: 43.85 KG/M2 | OXYGEN SATURATION: 92 % | TEMPERATURE: 98.2 F | DIASTOLIC BLOOD PRESSURE: 85 MMHG | WEIGHT: 280 LBS | HEART RATE: 96 BPM

## 2024-09-02 DIAGNOSIS — H00.015 HORDEOLUM OF LEFT LOWER EYELID, UNSPECIFIED HORDEOLUM TYPE: ICD-10-CM

## 2024-09-02 DIAGNOSIS — I10 HYPERTENSION, UNSPECIFIED TYPE: Primary | ICD-10-CM

## 2024-09-02 LAB
ANION GAP SERPL CALCULATED.3IONS-SCNC: 14 MMOL/L (ref 9–17)
BUN SERPL-MCNC: 12 MG/DL (ref 6–20)
BUN/CREAT SERPL: 17 (ref 9–20)
CALCIUM SERPL-MCNC: 9.4 MG/DL (ref 8.6–10.4)
CHLORIDE SERPL-SCNC: 103 MMOL/L (ref 98–107)
CO2 SERPL-SCNC: 24 MMOL/L (ref 20–31)
CREAT SERPL-MCNC: 0.7 MG/DL (ref 0.5–0.9)
GFR, ESTIMATED: >90 ML/MIN/1.73M2
GLUCOSE SERPL-MCNC: 145 MG/DL (ref 70–99)
POTASSIUM SERPL-SCNC: 3.8 MMOL/L (ref 3.7–5.3)
SODIUM SERPL-SCNC: 141 MMOL/L (ref 135–144)

## 2024-09-02 PROCEDURE — 80048 BASIC METABOLIC PNL TOTAL CA: CPT

## 2024-09-02 PROCEDURE — 99283 EMERGENCY DEPT VISIT LOW MDM: CPT

## 2024-09-02 ASSESSMENT — VISUAL ACUITY: OU: 1

## 2024-09-02 ASSESSMENT — ENCOUNTER SYMPTOMS
NAUSEA: 0
VOMITING: 0
SHORTNESS OF BREATH: 0
ROS SKIN COMMENTS: LEFT EYE STYE
ABDOMINAL PAIN: 0

## 2024-09-02 ASSESSMENT — PAIN - FUNCTIONAL ASSESSMENT: PAIN_FUNCTIONAL_ASSESSMENT: NONE - DENIES PAIN

## 2024-09-06 ENCOUNTER — OFFICE VISIT (OUTPATIENT)
Age: 49
End: 2024-09-06
Payer: COMMERCIAL

## 2024-09-06 VITALS
HEIGHT: 67 IN | TEMPERATURE: 97.7 F | SYSTOLIC BLOOD PRESSURE: 131 MMHG | HEART RATE: 78 BPM | DIASTOLIC BLOOD PRESSURE: 74 MMHG | WEIGHT: 284.4 LBS | BODY MASS INDEX: 44.64 KG/M2 | RESPIRATION RATE: 16 BRPM

## 2024-09-06 DIAGNOSIS — H00.015 HORDEOLUM EXTERNUM OF LEFT LOWER EYELID: ICD-10-CM

## 2024-09-06 DIAGNOSIS — Z87.891 FORMER SMOKER: ICD-10-CM

## 2024-09-06 DIAGNOSIS — Z23 IMMUNIZATION DUE: ICD-10-CM

## 2024-09-06 DIAGNOSIS — I10 PRIMARY HYPERTENSION: Primary | ICD-10-CM

## 2024-09-06 PROCEDURE — 90656 IIV3 VACC NO PRSV 0.5 ML IM: CPT

## 2024-09-06 PROCEDURE — 96372 THER/PROPH/DIAG INJ SC/IM: CPT

## 2024-09-06 PROCEDURE — 3078F DIAST BP <80 MM HG: CPT

## 2024-09-06 PROCEDURE — 3075F SYST BP GE 130 - 139MM HG: CPT

## 2024-09-06 PROCEDURE — 90677 PCV20 VACCINE IM: CPT | Performed by: FAMILY MEDICINE

## 2024-09-06 PROCEDURE — 99212 OFFICE O/P EST SF 10 MIN: CPT

## 2024-09-06 PROCEDURE — 99213 OFFICE O/P EST LOW 20 MIN: CPT

## 2024-09-06 RX ORDER — LOSARTAN POTASSIUM 50 MG/1
50 TABLET ORAL 2 TIMES DAILY
Qty: 90 TABLET | Refills: 1 | Status: SHIPPED | OUTPATIENT
Start: 2024-09-06

## 2024-09-06 RX ORDER — ATORVASTATIN CALCIUM 20 MG/1
20 TABLET, FILM COATED ORAL DAILY
Qty: 90 TABLET | Refills: 1 | Status: SHIPPED | OUTPATIENT
Start: 2024-09-06

## 2024-09-09 ENCOUNTER — TELEPHONE (OUTPATIENT)
Dept: BARIATRICS/WEIGHT MGMT | Age: 49
End: 2024-09-09

## 2024-09-10 RX ORDER — ATORVASTATIN CALCIUM 20 MG/1
20 TABLET, FILM COATED ORAL DAILY
Qty: 30 TABLET | Refills: 5 | Status: SHIPPED | OUTPATIENT
Start: 2024-09-10

## 2024-09-16 ENCOUNTER — HOSPITAL ENCOUNTER (EMERGENCY)
Age: 49
Discharge: HOME | End: 2024-09-16
Attending: EMERGENCY MEDICINE
Payer: COMMERCIAL

## 2024-09-16 ENCOUNTER — APPOINTMENT (OUTPATIENT)
Dept: CT IMAGING | Age: 49
End: 2024-09-16
Payer: COMMERCIAL

## 2024-09-16 ENCOUNTER — APPOINTMENT (OUTPATIENT)
Dept: GENERAL RADIOLOGY | Age: 49
End: 2024-09-16
Payer: COMMERCIAL

## 2024-09-16 VITALS
DIASTOLIC BLOOD PRESSURE: 91 MMHG | SYSTOLIC BLOOD PRESSURE: 150 MMHG | WEIGHT: 275 LBS | HEIGHT: 67 IN | BODY MASS INDEX: 43.16 KG/M2 | TEMPERATURE: 97.7 F | RESPIRATION RATE: 14 BRPM | OXYGEN SATURATION: 96 % | HEART RATE: 79 BPM

## 2024-09-16 DIAGNOSIS — R26.89 IMBALANCE: Primary | ICD-10-CM

## 2024-09-16 DIAGNOSIS — G35 MULTIPLE SCLEROSIS (HCC): ICD-10-CM

## 2024-09-16 DIAGNOSIS — R53.83 OTHER FATIGUE: ICD-10-CM

## 2024-09-16 LAB
ANION GAP SERPL CALCULATED.3IONS-SCNC: 11 MMOL/L (ref 9–17)
BASOPHILS # BLD: <0.03 K/UL (ref 0–0.2)
BASOPHILS NFR BLD: 0 % (ref 0–2)
BILIRUB UR QL STRIP: NEGATIVE
BUN SERPL-MCNC: 10 MG/DL (ref 6–20)
BUN/CREAT SERPL: 14 (ref 9–20)
CALCIUM SERPL-MCNC: 9.4 MG/DL (ref 8.6–10.4)
CHLORIDE SERPL-SCNC: 102 MMOL/L (ref 98–107)
CLARITY UR: CLEAR
CO2 SERPL-SCNC: 27 MMOL/L (ref 20–31)
COLOR UR: YELLOW
CREAT SERPL-MCNC: 0.7 MG/DL (ref 0.5–0.9)
EOSINOPHIL # BLD: 0.12 K/UL (ref 0–0.44)
EOSINOPHILS RELATIVE PERCENT: 2 % (ref 1–4)
ERYTHROCYTE [DISTWIDTH] IN BLOOD BY AUTOMATED COUNT: 13.9 % (ref 11.8–14.4)
GFR, ESTIMATED: >90 ML/MIN/1.73M2
GLUCOSE SERPL-MCNC: 103 MG/DL (ref 70–99)
GLUCOSE UR STRIP-MCNC: NEGATIVE MG/DL
HCT VFR BLD AUTO: 36.6 % (ref 36.3–47.1)
HGB BLD-MCNC: 11.6 G/DL (ref 11.9–15.1)
HGB UR QL STRIP.AUTO: NEGATIVE
IMM GRANULOCYTES # BLD AUTO: 0.02 K/UL (ref 0–0.3)
IMM GRANULOCYTES NFR BLD: 0 %
INR PPP: 0.9
KETONES UR STRIP-MCNC: NEGATIVE MG/DL
LEUKOCYTE ESTERASE UR QL STRIP: NEGATIVE
LYMPHOCYTES NFR BLD: 1.45 K/UL (ref 1.1–3.7)
LYMPHOCYTES RELATIVE PERCENT: 25 % (ref 24–43)
MAGNESIUM SERPL-MCNC: 2 MG/DL (ref 1.6–2.6)
MCH RBC QN AUTO: 29.5 PG (ref 25.2–33.5)
MCHC RBC AUTO-ENTMCNC: 31.7 G/DL (ref 28.4–34.8)
MCV RBC AUTO: 93.1 FL (ref 82.6–102.9)
MONOCYTES NFR BLD: 0.53 K/UL (ref 0.1–1.2)
MONOCYTES NFR BLD: 9 % (ref 3–12)
NEUTROPHILS NFR BLD: 64 % (ref 36–65)
NEUTS SEG NFR BLD: 3.75 K/UL (ref 1.5–8.1)
NITRITE UR QL STRIP: NEGATIVE
NRBC BLD-RTO: 0 PER 100 WBC
PARTIAL THROMBOPLASTIN TIME: 26.8 SEC (ref 23.9–33.8)
PH UR STRIP: 6 [PH] (ref 5–8)
PHOSPHATE SERPL-MCNC: 3.6 MG/DL (ref 2.6–4.5)
PLATELET # BLD AUTO: 195 K/UL (ref 138–453)
PMV BLD AUTO: 10.4 FL (ref 8.1–13.5)
POTASSIUM SERPL-SCNC: 4.3 MMOL/L (ref 3.7–5.3)
PROT UR STRIP-MCNC: NEGATIVE MG/DL
PROTHROMBIN TIME: 12.6 SEC (ref 11.5–14.2)
RBC # BLD AUTO: 3.93 M/UL (ref 3.95–5.11)
SODIUM SERPL-SCNC: 140 MMOL/L (ref 135–144)
SP GR UR STRIP: 1.01 (ref 1–1.03)
TROPONIN I SERPL HS-MCNC: 6 NG/L (ref 0–14)
TROPONIN I SERPL HS-MCNC: 7 NG/L (ref 0–14)
UROBILINOGEN UR STRIP-ACNC: NORMAL EU/DL (ref 0–1)
WBC OTHER # BLD: 5.9 K/UL (ref 3.5–11.3)

## 2024-09-16 PROCEDURE — 85025 COMPLETE CBC W/AUTO DIFF WBC: CPT

## 2024-09-16 PROCEDURE — 70450 CT HEAD/BRAIN W/O DYE: CPT

## 2024-09-16 PROCEDURE — 2580000003 HC RX 258: Performed by: EMERGENCY MEDICINE

## 2024-09-16 PROCEDURE — 70498 CT ANGIOGRAPHY NECK: CPT

## 2024-09-16 PROCEDURE — 85730 THROMBOPLASTIN TIME PARTIAL: CPT

## 2024-09-16 PROCEDURE — 71045 X-RAY EXAM CHEST 1 VIEW: CPT

## 2024-09-16 PROCEDURE — 85610 PROTHROMBIN TIME: CPT

## 2024-09-16 PROCEDURE — 83735 ASSAY OF MAGNESIUM: CPT

## 2024-09-16 PROCEDURE — 93005 ELECTROCARDIOGRAM TRACING: CPT | Performed by: EMERGENCY MEDICINE

## 2024-09-16 PROCEDURE — 84484 ASSAY OF TROPONIN QUANT: CPT

## 2024-09-16 PROCEDURE — 84100 ASSAY OF PHOSPHORUS: CPT

## 2024-09-16 PROCEDURE — 99285 EMERGENCY DEPT VISIT HI MDM: CPT

## 2024-09-16 PROCEDURE — 81003 URINALYSIS AUTO W/O SCOPE: CPT

## 2024-09-16 PROCEDURE — 80048 BASIC METABOLIC PNL TOTAL CA: CPT

## 2024-09-16 PROCEDURE — 6360000004 HC RX CONTRAST MEDICATION: Performed by: EMERGENCY MEDICINE

## 2024-09-16 RX ORDER — IOPAMIDOL 755 MG/ML
75 INJECTION, SOLUTION INTRAVASCULAR
Status: COMPLETED | OUTPATIENT
Start: 2024-09-16 | End: 2024-09-16

## 2024-09-16 RX ORDER — SODIUM CHLORIDE 0.9 % (FLUSH) 0.9 %
10 SYRINGE (ML) INJECTION PRN
Status: DISCONTINUED | OUTPATIENT
Start: 2024-09-16 | End: 2024-09-16 | Stop reason: HOSPADM

## 2024-09-16 RX ORDER — 0.9 % SODIUM CHLORIDE 0.9 %
100 INTRAVENOUS SOLUTION INTRAVENOUS ONCE
Status: COMPLETED | OUTPATIENT
Start: 2024-09-16 | End: 2024-09-16

## 2024-09-16 RX ADMIN — SODIUM CHLORIDE 100 ML: 0.9 INJECTION, SOLUTION INTRAVENOUS at 19:12

## 2024-09-16 RX ADMIN — SODIUM CHLORIDE, PRESERVATIVE FREE 10 ML: 5 INJECTION INTRAVENOUS at 19:12

## 2024-09-16 RX ADMIN — IOPAMIDOL 75 ML: 755 INJECTION, SOLUTION INTRAVENOUS at 19:11

## 2024-09-16 ASSESSMENT — ENCOUNTER SYMPTOMS
ABDOMINAL PAIN: 0
FACIAL SWELLING: 0
EYE PAIN: 0
SHORTNESS OF BREATH: 0
VOMITING: 0
VOICE CHANGE: 0
NAUSEA: 0
BACK PAIN: 0
COLOR CHANGE: 0
PHOTOPHOBIA: 0
CHEST TIGHTNESS: 0
TROUBLE SWALLOWING: 0

## 2024-09-16 ASSESSMENT — PAIN - FUNCTIONAL ASSESSMENT: PAIN_FUNCTIONAL_ASSESSMENT: NONE - DENIES PAIN

## 2024-09-17 LAB
EKG ATRIAL RATE: 67 BPM
EKG P AXIS: 60 DEGREES
EKG P-R INTERVAL: 182 MS
EKG Q-T INTERVAL: 404 MS
EKG QRS DURATION: 92 MS
EKG QTC CALCULATION (BAZETT): 426 MS
EKG R AXIS: 3 DEGREES
EKG T AXIS: 27 DEGREES
EKG VENTRICULAR RATE: 67 BPM

## 2024-09-20 ENCOUNTER — OFFICE VISIT (OUTPATIENT)
Age: 49
End: 2024-09-20
Payer: COMMERCIAL

## 2024-09-20 VITALS
SYSTOLIC BLOOD PRESSURE: 133 MMHG | HEIGHT: 67 IN | DIASTOLIC BLOOD PRESSURE: 83 MMHG | HEART RATE: 72 BPM | TEMPERATURE: 98 F | RESPIRATION RATE: 16 BRPM | WEIGHT: 285 LBS | BODY MASS INDEX: 44.73 KG/M2

## 2024-09-20 DIAGNOSIS — E66.01 CLASS 3 SEVERE OBESITY DUE TO EXCESS CALORIES WITHOUT SERIOUS COMORBIDITY WITH BODY MASS INDEX (BMI) OF 40.0 TO 44.9 IN ADULT: ICD-10-CM

## 2024-09-20 DIAGNOSIS — G35 MULTIPLE SCLEROSIS (HCC): ICD-10-CM

## 2024-09-20 DIAGNOSIS — I11.9 HYPERTENSIVE HEART DISEASE WITHOUT HEART FAILURE: Primary | ICD-10-CM

## 2024-09-20 DIAGNOSIS — Z87.891 FORMER SMOKER: ICD-10-CM

## 2024-09-20 PROCEDURE — 99212 OFFICE O/P EST SF 10 MIN: CPT

## 2024-09-20 RX ORDER — MECLIZINE HYDROCHLORIDE 25 MG/1
25 TABLET ORAL 3 TIMES DAILY PRN
COMMUNITY
End: 2024-09-20 | Stop reason: SDUPTHER

## 2024-09-20 RX ORDER — MECLIZINE HYDROCHLORIDE 25 MG/1
25 TABLET ORAL 3 TIMES DAILY PRN
Qty: 30 TABLET | Refills: 1 | Status: SHIPPED | OUTPATIENT
Start: 2024-09-20

## 2024-09-20 RX ORDER — PREDNISONE 10 MG/1
10 TABLET ORAL DAILY
COMMUNITY

## 2024-10-02 ENCOUNTER — PATIENT MESSAGE (OUTPATIENT)
Age: 49
End: 2024-10-02

## 2024-10-02 DIAGNOSIS — I10 PRIMARY HYPERTENSION: ICD-10-CM

## 2024-10-02 RX ORDER — LOSARTAN POTASSIUM 50 MG/1
50 TABLET ORAL 2 TIMES DAILY
Qty: 180 TABLET | Refills: 1 | Status: SHIPPED | OUTPATIENT
Start: 2024-10-02

## 2024-10-02 NOTE — TELEPHONE ENCOUNTER
Notes reviewed.  Dr. Ulrich did see the patient in the office and adjusted his losartan to 50 mg twice a day as noted.  He did write a 3-month supply with only 90.  She is now changing pharmacies

## 2024-10-05 NOTE — TELEPHONE ENCOUNTER
EMERGENCY DEPARTMENT ENCOUNTER    Room Number:  32/32  PCP: Andie Song APRN  History obtained from: Patient, family, caregiver      HPI:  Chief Complaint: Speech disturbance  A complete HPI/ROS/PMH/PSH/SH/FH are unobtainable due to: N/A  Context: Ryann Hernadez is a 89 y.o. female who presents to the ED c/o speech disturbance.  Sudden onset earlier today, lasted around 15 to 30 minutes.  Currently completely resolved.  EMS reports they also noted a right-sided facial droop and route.  History of stroke.  Currently on Eliquis.  Patient currently denies any complaints.  Reports she has had some rash on her face bilaterally however is using a cream that dermatology gave her.  No fevers or chills.  No vomiting.            PAST MEDICAL HISTORY  Active Ambulatory Problems     Diagnosis Date Noted    Allergic rhinitis 01/15/2014    Gastroesophageal reflux disease 01/15/2014    Essential hypertension 01/09/2013    Ataxic gait 08/12/2016    Balance disorder 08/12/2016    At high risk for falls 08/07/2017    Paroxysmal atrial fibrillation 09/22/2019    Aortic root dilatation 10/10/2019    Alzheimer's dementia 09/26/2018    Age-related osteoporosis without current pathological fracture 09/13/2018    [place] of foot 11/05/2019    Ascending aortic aneurysm 02/06/2020    Acute deep vein thrombosis (DVT) of calf muscle vein of left lower extremity 02/06/2020    Paroxysmal atrial fibrillation 02/03/2021    Elevated troponin 07/05/2022    Embolic stroke 07/06/2022    History of stroke 03/01/2024    CKD (chronic kidney disease) stage 3, GFR 30-59 ml/min 03/01/2024    B12 deficiency 03/02/2024     Resolved Ambulatory Problems     Diagnosis Date Noted    Acute right otitis media 08/12/2016    Nonintractable episodic headache 09/14/2016    Acute recurrent maxillary sinusitis 02/23/2017    Facial swelling 02/23/2017    Dizziness 02/23/2017    Medicare annual wellness visit, initial 03/06/2017    Hospital discharge follow-up  Schedule 03/06/2017    Renal insufficiency 08/07/2017    Ureteral stone with hydronephrosis 09/22/2019    Hydronephrosis due to obstruction of ureter 09/24/2019    BPPV (benign paroxysmal positional vertigo), unspecified laterality 01/08/2020    TIA (transient ischemic attack) 02/05/2020    Medicare annual wellness visit, subsequent 01/20/2021    Fall at home 07/04/2022    RUBEN (acute kidney injury) 07/05/2022    Rhabdomyolysis 07/05/2022    COVID-19 virus detected 07/05/2022    Right hip pain 02/29/2024    Acute urinary retention 03/01/2024    UTI (urinary tract infection) 03/02/2024     Past Medical History:   Diagnosis Date    A-fib     Anemia     Aneurysm, ascending aorta 03/22/2016    Anxiety     Atypical chest pain     BPPV (benign paroxysmal positional vertigo)     Bradycardia     Chronic cystitis     Chronic headaches     Chronic nonintractable headache 9/14/2016    Chronic paroxysmal hemicrania     Depression     Esophagitis     Gastritis     GERD (gastroesophageal reflux disease)     Hematuria     High blood pressure     History of cataract     History of irritable bowel syndrome     Hypertension     IBS (irritable bowel syndrome)     Influenza A     Kidney lesion     Labyrinthitis 07/28/2014    OA (osteoarthritis)     Osteoporosis 9/13/2018    Other abnormal clinical finding 09/21/2012    Pain of thigh     Personal history of gastric ulcer     Renal disorder          PAST SURGICAL HISTORY  Past Surgical History:   Procedure Laterality Date    BREAST LUMPECTOMY      BREAST SURGERY      CATARACT EXTRACTION      COLONOSCOPY N/A     DR. MARGARITA MENDOZA    ELBOW ARTHROTOMY  07/01/1999    DR. RYAN RITCHIE    EYE SURGERY      FRACTURE SURGERY      HAND, BROKEN FINGERS  DR. MIKE CHRISTIANSON    KNEE SURGERY  08/31/1999    DR. THADDEUS MENDES    OOPHORECTOMY      TUBAL ABDOMINAL LIGATION Bilateral 1962    DR. EDDA MANNING         FAMILY HISTORY  Family History   Problem Relation Age of Onset    Heart attack Mother     Arthritis Mother      Arthritis Father     Arthritis Sister     Arthritis Other     Kidney disease Other     Thyroid disease Other     Diabetes Other     Hypertension Other     Heart disease Other     Arthritis Maternal Grandmother          SOCIAL HISTORY  Social History     Socioeconomic History    Marital status:    Tobacco Use    Smoking status: Never    Smokeless tobacco: Never   Vaping Use    Vaping status: Never Used   Substance and Sexual Activity    Alcohol use: No    Drug use: No    Sexual activity: Defer         ALLERGIES  Sulfa antibiotics, Lisinopril, Milk-related compounds, and Olmesartan        REVIEW OF SYSTEMS    As per HPI      PHYSICAL EXAM  ED Triage Vitals [10/04/24 1401]   Temp Heart Rate Resp BP SpO2   98 °F (36.7 °C) 58 19 137/79 98 %      Temp src Heart Rate Source Patient Position BP Location FiO2 (%)   -- -- -- -- --       Physical Exam  Constitutional:       General: She is not in acute distress.  HENT:      Head: Normocephalic and atraumatic.      Comments: Mild erythema over the nasolabial folds bilaterally with some areas of central pallor, left greater than right  Cardiovascular:      Rate and Rhythm: Normal rate and regular rhythm.   Pulmonary:      Effort: Pulmonary effort is normal. No respiratory distress.   Abdominal:      General: There is no distension.      Palpations: Abdomen is soft.      Tenderness: There is no abdominal tenderness.   Musculoskeletal:         General: No swelling or deformity.   Skin:     General: Skin is warm and dry.   Neurological:      Mental Status: She is alert. Mental status is at baseline.           Vital signs and nursing notes reviewed.          LAB RESULTS  Recent Results (from the past 24 hour(s))   ECG 12 Lead Stroke Evaluation    Collection Time: 10/04/24  2:59 PM   Result Value Ref Range    QT Interval 463 ms    QTC Interval 447 ms   Comprehensive Metabolic Panel    Collection Time: 10/04/24  4:22 PM    Specimen: Blood   Result Value Ref Range    Glucose  90 65 - 99 mg/dL    BUN 12 8 - 23 mg/dL    Creatinine 1.00 0.57 - 1.00 mg/dL    Sodium 144 136 - 145 mmol/L    Potassium 3.9 3.5 - 5.2 mmol/L    Chloride 109 (H) 98 - 107 mmol/L    CO2 26.0 22.0 - 29.0 mmol/L    Calcium 9.7 (H) 8.2 - 9.6 mg/dL    Total Protein 7.0 6.0 - 8.5 g/dL    Albumin 3.9 3.5 - 5.2 g/dL    ALT (SGPT) 30 1 - 33 U/L    AST (SGOT) 38 (H) 1 - 32 U/L    Alkaline Phosphatase 113 39 - 117 U/L    Total Bilirubin 0.8 0.0 - 1.2 mg/dL    Globulin 3.1 gm/dL    A/G Ratio 1.3 g/dL    BUN/Creatinine Ratio 12.0 7.0 - 25.0    Anion Gap 9.0 5.0 - 15.0 mmol/L    eGFR 52.6 (L) >60.0 mL/min/1.73   Protime-INR    Collection Time: 10/04/24  4:22 PM    Specimen: Blood   Result Value Ref Range    Protime 15.5 (H) 11.7 - 14.2 Seconds    INR 1.21 (H) 0.90 - 1.10   CBC Auto Differential    Collection Time: 10/04/24  4:22 PM    Specimen: Blood   Result Value Ref Range    WBC 6.57 3.40 - 10.80 10*3/mm3    RBC 3.65 (L) 3.77 - 5.28 10*6/mm3    Hemoglobin 10.5 (L) 12.0 - 15.9 g/dL    Hematocrit 32.8 (L) 34.0 - 46.6 %    MCV 89.9 79.0 - 97.0 fL    MCH 28.8 26.6 - 33.0 pg    MCHC 32.0 31.5 - 35.7 g/dL    RDW 13.3 12.3 - 15.4 %    RDW-SD 43.4 37.0 - 54.0 fl    MPV 9.9 6.0 - 12.0 fL    Platelets 246 140 - 450 10*3/mm3    Neutrophil % 61.0 42.7 - 76.0 %    Lymphocyte % 27.1 19.6 - 45.3 %    Monocyte % 9.4 5.0 - 12.0 %    Eosinophil % 1.5 0.3 - 6.2 %    Basophil % 0.5 0.0 - 1.5 %    Immature Grans % 0.5 0.0 - 0.5 %    Neutrophils, Absolute 4.01 1.70 - 7.00 10*3/mm3    Lymphocytes, Absolute 1.78 0.70 - 3.10 10*3/mm3    Monocytes, Absolute 0.62 0.10 - 0.90 10*3/mm3    Eosinophils, Absolute 0.10 0.00 - 0.40 10*3/mm3    Basophils, Absolute 0.03 0.00 - 0.20 10*3/mm3    Immature Grans, Absolute 0.03 0.00 - 0.05 10*3/mm3    nRBC 0.0 0.0 - 0.2 /100 WBC   Urinalysis With Microscopic If Indicated (No Culture) - Urine, Clean Catch    Collection Time: 10/04/24  6:56 PM    Specimen: Urine, Clean Catch   Result Value Ref Range    Color, UA Yellow  Yellow, Straw    Appearance, UA Clear Clear    pH, UA 7.5 5.0 - 8.0    Specific Gravity, UA 1.007 1.005 - 1.030    Glucose, UA Negative Negative    Ketones, UA Negative Negative    Bilirubin, UA Negative Negative    Blood, UA Negative Negative    Protein, UA 30 mg/dL (1+) (A) Negative    Leuk Esterase, UA Small (1+) (A) Negative    Nitrite, UA Negative Negative    Urobilinogen, UA 0.2 E.U./dL 0.2 - 1.0 E.U./dL   Urinalysis, Microscopic Only - Urine, Clean Catch    Collection Time: 10/04/24  6:56 PM    Specimen: Urine, Clean Catch   Result Value Ref Range    RBC, UA 0-2 None Seen, 0-2 /HPF    WBC, UA 3-5 (A) None Seen, 0-2 /HPF    Bacteria, UA None Seen None Seen /HPF    Squamous Epithelial Cells, UA 0-2 None Seen, 0-2 /HPF    Hyaline Casts, UA None Seen None Seen /LPF    Methodology Automated Microscopy        Ordered the above labs and reviewed the results.        RADIOLOGY  CT Head Without Contrast    Result Date: 10/4/2024  CT HEAD WO CONTRAST-  HISTORY:  TIA symptoms  COMPARISON: CT head 2/29/2024  FINDINGS: A remote infarct involving the right cerebellar hemisphere posterolaterally is appreciated measuring approximately 1.5 cm in size. This was present previously. Moderate to severe small vessel ischemic disease is noted. Moderate vascular calcification is appreciated.      Extensive small vessel ischemic disease and remote right cerebellar infarct is appreciated. These findings appear similar to 2/29/2024. There is no evidence of hemorrhage or of acute infarction. Further evaluation could be performed with a MRI examination of the brain.      Radiation dose reduction techniques were utilized, including automated exposure modulation based on body size.  This report was finalized on 10/4/2024 4:03 PM by Dr. Ruddy Delatorre M.D on Workstation: [id number]       Ordered the above noted radiological studies. Reviewed by me in PACS.              MEDICATIONS GIVEN IN ER  Medications   sodium chloride 0.9 % flush 10  mL (has no administration in time range)               MEDICAL DECISION MAKING, PROGRESS, and CONSULTS    MDM: Patient presented emergency department with suspected TIA, otherwise well-appearing, vitals otherwise stable.  Labs and imaging otherwise reassuring in the emergency department.  Discussed case with neurology, offered the patient admission for MRI and further workup however patient would like to go home.  This seems reasonable especially given the patient is already on antithrombotic therapy.  Recommend continuing Eliquis and following up as an outpatient.  Given return precautions and discharged home.    All labs have been independently reviewed by me.  All radiology studies have been reviewed by me and I have also reviewed the radiology report.   EKG's independently viewed and interpreted by me.  Discussion below represents my analysis of pertinent findings related to patient's condition, differential diagnosis, treatment plan and final disposition.      Additional sources:  - Discussed/ obtained information from independent historians:      - External (non-ED) record review:     - Chronic or social conditions impacting care:     - Shared decision making:        Orders placed during this visit:  Orders Placed This Encounter   Procedures    CT Head Without Contrast    Comprehensive Metabolic Panel    Protime-INR    CBC Auto Differential    Urinalysis With Microscopic If Indicated (No Culture) - Urine, Clean Catch    Urinalysis, Microscopic Only - Urine, Clean Catch    Inpatient Neurology Consult Stroke    ECG 12 Lead Stroke Evaluation    Insert Peripheral IV    CBC & Differential         Additional orders considered but not ordered:  Considered MRI of the brain however patient would like to go home.        Differential diagnosis includes but is not limited to:    TIA, intracranial hemorrhage, acute vascular lesion, electrolyte abnormality, metabolic encephalopathy, urinary tract  infection        Independent interpretation of labs, radiology studies, and discussions with consultants:  ED Course as of 10/04/24 2030   Fri Oct 04, 2024   1532 First look prior to oncoming provider:    Patient with history of prior TIA, anticoagulated on Eliquis presents with slurred speech and facial droop which lasted about 15 minutes and is currently resolved.    Vitals reviewed are fairly unremarkable without significant blood pressure, heart rate or O2 saturation issues.    Will get stroke workup initiated for the oncoming provider to include EKG, CT and routine labs.  Patient is not a tPA candidate as symptoms have resolved and she is anticoagulated on Eliquis. [DB]   1753 EKG interpreted myself:  1459, A-fib at rate of 56, no acute ST segment changes or T wave inversions. [FS]   1912 WBC, UA(!): 3-5 [FS]      ED Course User Index  [DB] New Luna MD  [FS] Isaiah Wolf MD           DIAGNOSIS  Final diagnoses:   TIA (transient ischemic attack)         DISPOSITION  Discharged home        Latest Documented Vital Signs:  As of 20:30 EDT  BP- 127/94 HR- 65 Temp- 98 °F (36.7 °C) O2 sat- 94%              --    Please note that portions of this were completed with a voice recognition program.       Note Disclaimer: At [facility], we believe that sharing information builds trust and better relationships. You are receiving this note because you are receiving care at [facility] or recently visited. It is possible you will see health information before a provider has talked with you about it. This kind of information can be easy to misunderstand. To help you fully understand what it means for your health, we urge you to discuss this note with your provider.             Isaiah Wolf MD  10/04/24 2033

## 2024-10-19 ENCOUNTER — HOSPITAL ENCOUNTER (EMERGENCY)
Age: 49
Discharge: HOME | End: 2024-10-19
Attending: EMERGENCY MEDICINE
Payer: COMMERCIAL

## 2024-10-19 VITALS
WEIGHT: 285 LBS | BODY MASS INDEX: 44.73 KG/M2 | HEART RATE: 70 BPM | TEMPERATURE: 98.2 F | HEIGHT: 67 IN | DIASTOLIC BLOOD PRESSURE: 98 MMHG | OXYGEN SATURATION: 95 % | SYSTOLIC BLOOD PRESSURE: 145 MMHG | RESPIRATION RATE: 14 BRPM

## 2024-10-19 DIAGNOSIS — I10 UNCONTROLLED HYPERTENSION: Primary | ICD-10-CM

## 2024-10-19 LAB
ALBUMIN SERPL-MCNC: 4.1 G/DL (ref 3.5–5.2)
ALP SERPL-CCNC: 94 U/L (ref 35–104)
ALT SERPL-CCNC: 43 U/L (ref 5–33)
ANION GAP SERPL CALCULATED.3IONS-SCNC: 12 MMOL/L (ref 9–17)
AST SERPL-CCNC: 34 U/L
BASOPHILS # BLD: <0.03 K/UL (ref 0–0.2)
BASOPHILS NFR BLD: 0 % (ref 0–2)
BILIRUB SERPL-MCNC: 0.7 MG/DL (ref 0.3–1.2)
BUN SERPL-MCNC: 11 MG/DL (ref 6–20)
BUN/CREAT SERPL: 18 (ref 9–20)
CALCIUM SERPL-MCNC: 9.1 MG/DL (ref 8.6–10.4)
CHLORIDE SERPL-SCNC: 103 MMOL/L (ref 98–107)
CO2 SERPL-SCNC: 25 MMOL/L (ref 20–31)
CREAT SERPL-MCNC: 0.6 MG/DL (ref 0.5–0.9)
EOSINOPHIL # BLD: 0.13 K/UL (ref 0–0.44)
EOSINOPHILS RELATIVE PERCENT: 2 % (ref 1–4)
ERYTHROCYTE [DISTWIDTH] IN BLOOD BY AUTOMATED COUNT: 14.5 % (ref 11.8–14.4)
GFR, ESTIMATED: >90 ML/MIN/1.73M2
GLUCOSE SERPL-MCNC: 117 MG/DL (ref 70–99)
HCT VFR BLD AUTO: 37.1 % (ref 36.3–47.1)
HGB BLD-MCNC: 12 G/DL (ref 11.9–15.1)
IMM GRANULOCYTES # BLD AUTO: 0.01 K/UL (ref 0–0.3)
IMM GRANULOCYTES NFR BLD: 0 %
LYMPHOCYTES NFR BLD: 1.28 K/UL (ref 1.1–3.7)
LYMPHOCYTES RELATIVE PERCENT: 22 % (ref 24–43)
MCH RBC QN AUTO: 29.9 PG (ref 25.2–33.5)
MCHC RBC AUTO-ENTMCNC: 32.3 G/DL (ref 28.4–34.8)
MCV RBC AUTO: 92.5 FL (ref 82.6–102.9)
MONOCYTES NFR BLD: 0.44 K/UL (ref 0.1–1.2)
MONOCYTES NFR BLD: 8 % (ref 3–12)
NEUTROPHILS NFR BLD: 68 % (ref 36–65)
NEUTS SEG NFR BLD: 3.93 K/UL (ref 1.5–8.1)
NRBC BLD-RTO: 0 PER 100 WBC
PLATELET # BLD AUTO: 199 K/UL (ref 138–453)
PMV BLD AUTO: 9.5 FL (ref 8.1–13.5)
POTASSIUM SERPL-SCNC: 4.1 MMOL/L (ref 3.7–5.3)
PROT SERPL-MCNC: 6.8 G/DL (ref 6.4–8.3)
RBC # BLD AUTO: 4.01 M/UL (ref 3.95–5.11)
RBC # BLD: ABNORMAL 10*6/UL
SODIUM SERPL-SCNC: 140 MMOL/L (ref 135–144)
WBC OTHER # BLD: 5.8 K/UL (ref 3.5–11.3)

## 2024-10-19 PROCEDURE — 80053 COMPREHEN METABOLIC PANEL: CPT

## 2024-10-19 PROCEDURE — 85025 COMPLETE CBC W/AUTO DIFF WBC: CPT

## 2024-10-19 PROCEDURE — 99284 EMERGENCY DEPT VISIT MOD MDM: CPT

## 2024-10-19 PROCEDURE — 6360000002 HC RX W HCPCS

## 2024-10-19 PROCEDURE — 96374 THER/PROPH/DIAG INJ IV PUSH: CPT

## 2024-10-19 PROCEDURE — 93005 ELECTROCARDIOGRAM TRACING: CPT | Performed by: EMERGENCY MEDICINE

## 2024-10-19 RX ORDER — HYDROCHLOROTHIAZIDE 25 MG/1
25 TABLET ORAL EVERY MORNING
Qty: 90 TABLET | Refills: 1 | Status: SHIPPED | OUTPATIENT
Start: 2024-10-20

## 2024-10-19 RX ORDER — LABETALOL HYDROCHLORIDE 5 MG/ML
10 INJECTION, SOLUTION INTRAVENOUS ONCE
Status: COMPLETED | OUTPATIENT
Start: 2024-10-19 | End: 2024-10-19

## 2024-10-19 RX ADMIN — LABETALOL HYDROCHLORIDE 10 MG: 5 INJECTION INTRAVENOUS at 14:23

## 2024-10-19 ASSESSMENT — PAIN - FUNCTIONAL ASSESSMENT: PAIN_FUNCTIONAL_ASSESSMENT: 0-10

## 2024-10-19 ASSESSMENT — ENCOUNTER SYMPTOMS
NAUSEA: 0
ABDOMINAL PAIN: 0
DIARRHEA: 0
BACK PAIN: 0
CHEST TIGHTNESS: 0
SHORTNESS OF BREATH: 0

## 2024-10-19 ASSESSMENT — PAIN SCALES - GENERAL: PAINLEVEL_OUTOF10: 0

## 2024-10-19 NOTE — ED NOTES
Pt presents to ER from home due to hypertension. Pt states she is compliant with her BP medication. Pt denies chest pain, SOB,nausea and vomiting . Pt states she has MS and recently had a flare up and currently having dizziness.Pt is A/O x 4, equal chest expansion with non labored breathing, wheels locked, bed in lowest position, call light in reach.

## 2024-10-19 NOTE — ED PROVIDER NOTES
[facility]  EMERGENCY DEPARTMENT ENCOUNTER      Pt Name: Gracie Lopez  MRN: [id number]  Birthdate 1975  Date of evaluation: 10/19/2024  Provider: Kaylee Bermudez MD    CHIEF COMPLAINT       Chief Complaint   Patient presents with    Hypertension     BP was 154/131 at home          HISTORY OF PRESENT ILLNESS   (Location/Symptom, Timing/Onset, Context/Setting, Quality, Duration, Modifying Factors, Severity)  Note limiting factors.   Gracie Lopez is a 49 y.o. female who presents to the emergency department for uncontrolled BP.  Pt has a hx of MS with left sided weakness after a recent flare up. Hx of HTN .  Patient has been checking her BP readings at home after her Losartan dosing increased from 50 once daily to BID. Recently finished a steroid taper.  Had reading of 170-190/100s yesterday before taking her meds and this am her BP was 154 /131 after taking her meds. Did endorse slight headache yesterday. Denies any blurry vision , chest pain , SOB or leg swelling.       Nursing Notes were reviewed.    REVIEW OF SYSTEMS    (2-9 systems for level 4, 10 or more for level 5)     Review of Systems   Constitutional:  Negative for chills and fever.   Eyes:  Negative for visual disturbance.   Respiratory:  Negative for chest tightness and shortness of breath.    Cardiovascular:  Negative for chest pain and palpitations.   Gastrointestinal:  Negative for abdominal pain, diarrhea and nausea.   Genitourinary:  Negative for dysuria and frequency.   Musculoskeletal:  Negative for back pain and myalgias.   Neurological:  Positive for headaches. Negative for dizziness, speech difficulty, weakness and light-headedness.       Except as noted above the remainder of the review of systems was reviewed and negative.       PAST MEDICAL HISTORY     Past Medical History:   Diagnosis Date    Anxiety     Cardiac murmur, previously undiagnosed     Cervical dysplasia     Depression     Effusion of acromioclavicular joint

## 2024-10-19 NOTE — ED TRIAGE NOTES
Patient comes to the ED with elevated blood pressure for the past two weeks, states the she increased her Losartan 50mg from once daily to twice daily per MD when this stated two weeks ago. Today she was instructed to come in to be evaluated.

## 2024-10-19 NOTE — ED PROVIDER NOTES
EMERGENCY DEPARTMENT ENCOUNTER   ATTENDING ATTESTATION     Pt Name: Gracie Lopez  MRN: [id number]  Birthdate 1975  Date of evaluation: 10/19/24       Gracie Lopez is a 49 y.o. female who presents with Hypertension (BP was 154/131 at home )      MDM:       Vitals:   Vitals:    10/19/24 1325   BP: (!) 179/104   Pulse: 87   Resp: 18   Temp: 98.2 °F (36.8 °C)   TempSrc: Oral   SpO2: 97%   Weight: 129.3 kg (285 lb)   Height: 1.702 m (5' 7\")         I personally saw and examined the patient. I have reviewed and agree with the resident's findings, including all diagnostic interpretations and treatment plan as written. I was present for the key portions of any procedures performed and the inclusive time noted for any critical care statement.    Kaylee Bermudez MD  Attending Emergency Physician            Kaylee Bermudez MD  10/20/24 3411

## 2024-10-21 ENCOUNTER — HOSPITAL ENCOUNTER (EMERGENCY)
Age: 49
Discharge: HOME | End: 2024-10-22
Attending: EMERGENCY MEDICINE
Payer: COMMERCIAL

## 2024-10-21 DIAGNOSIS — I10 HYPERTENSION, UNSPECIFIED TYPE: Primary | ICD-10-CM

## 2024-10-21 LAB
EKG ATRIAL RATE: 77 BPM
EKG P AXIS: 39 DEGREES
EKG P-R INTERVAL: 162 MS
EKG Q-T INTERVAL: 374 MS
EKG QRS DURATION: 88 MS
EKG QTC CALCULATION (BAZETT): 423 MS
EKG R AXIS: -21 DEGREES
EKG T AXIS: 47 DEGREES
EKG VENTRICULAR RATE: 77 BPM

## 2024-10-21 PROCEDURE — 93010 ELECTROCARDIOGRAM REPORT: CPT | Performed by: INTERNAL MEDICINE

## 2024-10-21 PROCEDURE — 99282 EMERGENCY DEPT VISIT SF MDM: CPT

## 2024-10-21 ASSESSMENT — PAIN - FUNCTIONAL ASSESSMENT: PAIN_FUNCTIONAL_ASSESSMENT: NONE - DENIES PAIN

## 2024-10-22 VITALS
HEIGHT: 67 IN | RESPIRATION RATE: 18 BRPM | SYSTOLIC BLOOD PRESSURE: 111 MMHG | BODY MASS INDEX: 44.73 KG/M2 | TEMPERATURE: 99.3 F | DIASTOLIC BLOOD PRESSURE: 89 MMHG | OXYGEN SATURATION: 97 % | HEART RATE: 89 BPM | WEIGHT: 285 LBS

## 2024-10-22 NOTE — ED NOTES
Pt presents to the er co hypertension pt states that she had taken her  blood pressure med at home and states that her systolic pressure was in the 200s pt blood pressure now is 111/89

## 2024-10-22 NOTE — ED PROVIDER NOTES
EMERGENCY DEPARTMENT ENCOUNTER    Pt Name: Gracie Lopez  MRN: [id number]  Birthdate 1975  Date of evaluation: 10/22/24  CHIEF COMPLAINT       Chief Complaint   Patient presents with    Hypertension     Took BP at home 222/116    Headache    Fatigue     HISTORY OF PRESENT ILLNESS   49-year-old female presents emergency room with concern about blood pressure.  Patient was seen here on 10/19/2024 with elevated blood pressure readings.  She was started on hydrochlorothiazide in addition to her losartan.  Tonight at home she started to get a headache and had a blood pressure at home with a systolic over 200 and diastolic over 100.  This brought her here to the emergency room.  She did take her evening losartan.  She reported that she is feeling a bit better in terms of her headache.  She reports the headache seems to be directly associated with blood pressure issues.             REVIEW OF SYSTEMS     Review of Systems   Constitutional:  Positive for fatigue.   Neurological:  Positive for headaches.     PASTMEDICAL HISTORY     Past Medical History:   Diagnosis Date    Anxiety     Cardiac murmur, previously undiagnosed     Cervical dysplasia     Depression     Effusion of acromioclavicular joint     Epidermoid cyst of skin     Fatigue     Hypercholesteremia     Hyperlipidemia     Hypertension     Insomnia     Irregular menses     IT band syndrome     Labyrinthitis     Migraines     Multiple sclerosis (HCC)     Muscle spasm     Obesity     Panic disorder without agoraphobia     PMS (premenstrual syndrome)     Post concussion syndrome     Swelling of joint of left shoulder     Tendonitis     rotator cuff    Vertigo      Past Problem List  Patient Active Problem List   Diagnosis Code    Multiple sclerosis (HCC) G35    Irregular menses N92.6    Essential hypertension I10    Pure hypercholesterolemia E78.00    Class 3 severe obesity with body mass index (BMI) of 40.0 to 44.9 in adult E66.813, E66.01, Z68.41    Depression

## 2024-10-23 ENCOUNTER — OFFICE VISIT (OUTPATIENT)
Age: 49
End: 2024-10-23
Payer: COMMERCIAL

## 2024-10-23 VITALS
RESPIRATION RATE: 16 BRPM | HEART RATE: 95 BPM | HEIGHT: 67 IN | DIASTOLIC BLOOD PRESSURE: 85 MMHG | BODY MASS INDEX: 44.89 KG/M2 | TEMPERATURE: 97.6 F | SYSTOLIC BLOOD PRESSURE: 128 MMHG | WEIGHT: 286 LBS

## 2024-10-23 DIAGNOSIS — Z09 HOSPITAL DISCHARGE FOLLOW-UP: ICD-10-CM

## 2024-10-23 DIAGNOSIS — I10 ESSENTIAL HYPERTENSION: Primary | ICD-10-CM

## 2024-10-23 PROCEDURE — 99212 OFFICE O/P EST SF 10 MIN: CPT

## 2024-10-23 RX ORDER — GABAPENTIN 100 MG/1
100 CAPSULE ORAL 2 TIMES DAILY
COMMUNITY
Start: 2024-10-19

## 2024-10-23 NOTE — PROGRESS NOTES
[facility]  [address] [place] [address]  Phone: [phone]  Fax: [phone]      Date of Visit: .TD  Patient Name: Gracie Lopez   Patient :  1975     ASSESSMENT/PLAN   1. Essential hypertension  2. Hospital discharge follow-up   -Patient's blood pressure is now well-controlled after losartan 50 mg and hydrochlorothiazide 25 mg.   -Patient reports of tolerating medication well with no concerns of side effects at this time.   -Patient instructed to take her blood pressure at home every other day due to increased anxiety during taking on a regular basis.  Patient is to follow-up in 4 weeks to make sure that she is tolerating medication and blood pressures under control with her heart hydrochlorothiazide.     Return in about 4 weeks (around 2024) for FU blood pressure .    HPI    Gracie Lopez is a 49 y.o. female who presents today to discuss   Chief Complaint   Patient presents with    Hypertension     Went to [facility] ER on Saturday and again on Monday  ---gdo     Patient is here for hospital follow-up for hypertension.  Patient had taken her blood pressure at home which reported to be 222/111 and patient was experiencing a headache and she reported herself to the ER at [facility].  During her ER visit today patient was discharged home with the hydrochlorothiazide 25 mg.  Besides on her losartan 50 mg patient says that she has been tolerating well her hydrochlorothiazide, blood pressure seems to be well-controlled today it is 128/85.  Patient denies having any headaches, vision changes, floaters, nausea or vomiting.  Patient does report that she gets a little bit anxious taking her blood pressure at home every single day, I instructed patient to take her blood pressure every other day until we know that she is on her appropriate dose and her blood pressure is well-controlled.    I personally reviewed the patient's past

## 2024-10-23 NOTE — PATIENT INSTRUCTIONS
Thank you for following up with us at [facility] outpatient residency clinic! It was a pleasure to meet you today!     Our plan is the following:  - continue taking medications as is  - if you start having any side effects on the medication, please do not stop abruptly but call the office or contact PCP through my chart for best guidance  - please log blood pressure at home so we can make sure you are in the right dosage.does not need to be everyday due to anxiety     If you have any additional questions or concerns, please call the office ([phone]) and speak to one of the staff. They will triage and forward the message to the doctors! Have a great rest of your day!

## 2024-10-24 ASSESSMENT — ENCOUNTER SYMPTOMS
DIARRHEA: 0
SHORTNESS OF BREATH: 0
ABDOMINAL PAIN: 0
ABDOMINAL DISTENTION: 0
CONSTIPATION: 0
WHEEZING: 0
VOMITING: 0
CHEST TIGHTNESS: 0

## 2024-10-31 RX ORDER — OXYBUTYNIN CHLORIDE 10 MG/1
10 TABLET, EXTENDED RELEASE ORAL DAILY
Qty: 30 TABLET | Refills: 5 | Status: SHIPPED | OUTPATIENT
Start: 2024-10-31

## 2024-11-26 PROBLEM — F23 BRIEF PSYCHOTIC DISORDER (HCC): Status: RESOLVED | Noted: 2023-11-03 | Resolved: 2024-11-26

## 2024-11-26 NOTE — PROGRESS NOTES
hypercholesterolemia    Class 3 severe obesity with body mass index (BMI) of 40.0 to 44.9 in adult    Depression with anxiety    Primary osteoarthritis of right wrist    Former cigarette smoker       Past Medical History:   Diagnosis Date    Anxiety     Cardiac murmur, previously undiagnosed     Cervical dysplasia     Depression     Effusion of acromioclavicular joint     Epidermoid cyst of skin     Fatigue     Hypercholesteremia     Hyperlipidemia     Hypertension     Insomnia     Irregular menses     IT band syndrome     Labyrinthitis     Migraines     Multiple sclerosis (HCC)     Muscle spasm     Obesity     Panic disorder without agoraphobia     PMS (premenstrual syndrome)     Post concussion syndrome     Swelling of joint of left shoulder     Tendonitis     rotator cuff    Vertigo        Past Surgical History:   Procedure Laterality Date    TUBAL LIGATION      WISDOM TOOTH EXTRACTION          Social History     Socioeconomic History    Marital status:      Spouse name: None    Number of children: None    Years of education: None    Highest education level: None   Tobacco Use    Smoking status: Former     Current packs/day: 0.00     Average packs/day: 1 pack/day for 30.0 years (30.0 ttl pk-yrs)     Types: Cigarettes     Start date: 1991     Quit date: 2021     Years since quitting: 3.9    Smokeless tobacco: Never    Tobacco comments:     Quit during pandemic   Substance and Sexual Activity    Alcohol use: Yes     Comment: occ    Drug use: No    Sexual activity: Yes     Social Determinants of Health     Financial Resource Strain: Low Risk  (2/15/2024)    Overall Financial Resource Strain (CARDIA)     Difficulty of Paying Living Expenses: Not hard at all   Food Insecurity: No Food Insecurity (2/15/2024)    Hunger Vital Sign     Worried About Running Out of Food in the Last Year: Never true     Ran Out of Food in the Last Year: Never true   Transportation Needs: Unknown (2/15/2024)    PRAPARE -

## 2024-11-27 ENCOUNTER — OFFICE VISIT (OUTPATIENT)
Age: 49
End: 2024-11-27
Payer: COMMERCIAL

## 2024-11-27 VITALS
WEIGHT: 292 LBS | RESPIRATION RATE: 18 BRPM | HEIGHT: 67 IN | SYSTOLIC BLOOD PRESSURE: 128 MMHG | TEMPERATURE: 97.6 F | BODY MASS INDEX: 45.83 KG/M2 | DIASTOLIC BLOOD PRESSURE: 76 MMHG | HEART RATE: 83 BPM

## 2024-11-27 DIAGNOSIS — I10 ESSENTIAL HYPERTENSION: Primary | ICD-10-CM

## 2024-11-27 PROCEDURE — 99214 OFFICE O/P EST MOD 30 MIN: CPT | Performed by: FAMILY MEDICINE

## 2024-11-27 ASSESSMENT — ENCOUNTER SYMPTOMS
CHEST TIGHTNESS: 0
SHORTNESS OF BREATH: 0
GASTROINTESTINAL NEGATIVE: 1

## 2024-11-27 NOTE — PATIENT INSTRUCTIONS
Glad to see you today.      Your blood pressure in the office has been good over the past 2 readings.  I believe that we have you on the proper blood pressure medication.  If you want to check your blood pressure it should be no more than 2 times a week.  We can do a blood pressure recheck in December if you would like when you come in with your .    We will give you a sheet on how to take your blood pressure properly at home.  Thank you for bringing your blood pressure cuff in today.    As far as diet is concerned, keeping a food diary which you can look at for \"low hanging fruit\" is best.  You have done well with this in the past  I would recommend a diet focused on vegetables, fruits, healthy protein that minimizes sweets, watch more than 1 portion of carbohydrates in a meal, avoid high sugar beverages and excess red meats.      Work to a BMI of 35 as a beginning goal.  That is about 10 to 15 pounds.      Please utilize your routine physical therapy routines that you learned for your MS which will keep you active..

## 2025-02-03 RX ORDER — ATORVASTATIN CALCIUM 20 MG/1
20 TABLET, FILM COATED ORAL DAILY
Qty: 30 TABLET | Refills: 5 | OUTPATIENT
Start: 2025-02-03

## 2025-02-03 RX ORDER — ATORVASTATIN CALCIUM 20 MG/1
20 TABLET, FILM COATED ORAL DAILY
Qty: 90 TABLET | Refills: 3 | Status: SHIPPED | OUTPATIENT
Start: 2025-02-03

## 2025-03-12 ENCOUNTER — PATIENT MESSAGE (OUTPATIENT)
Age: 50
End: 2025-03-12

## 2025-03-12 DIAGNOSIS — Z12.31 SCREENING MAMMOGRAM FOR BREAST CANCER: Primary | ICD-10-CM

## 2025-03-25 SDOH — ECONOMIC STABILITY: FOOD INSECURITY: WITHIN THE PAST 12 MONTHS, THE FOOD YOU BOUGHT JUST DIDN'T LAST AND YOU DIDN'T HAVE MONEY TO GET MORE.: NEVER TRUE

## 2025-03-25 SDOH — ECONOMIC STABILITY: TRANSPORTATION INSECURITY
IN THE PAST 12 MONTHS, HAS LACK OF TRANSPORTATION KEPT YOU FROM MEETINGS, WORK, OR FROM GETTING THINGS NEEDED FOR DAILY LIVING?: NO

## 2025-03-25 SDOH — ECONOMIC STABILITY: INCOME INSECURITY: IN THE LAST 12 MONTHS, WAS THERE A TIME WHEN YOU WERE NOT ABLE TO PAY THE MORTGAGE OR RENT ON TIME?: NO

## 2025-03-25 SDOH — ECONOMIC STABILITY: FOOD INSECURITY: WITHIN THE PAST 12 MONTHS, YOU WORRIED THAT YOUR FOOD WOULD RUN OUT BEFORE YOU GOT MONEY TO BUY MORE.: NEVER TRUE

## 2025-03-25 SDOH — ECONOMIC STABILITY: TRANSPORTATION INSECURITY
IN THE PAST 12 MONTHS, HAS THE LACK OF TRANSPORTATION KEPT YOU FROM MEDICAL APPOINTMENTS OR FROM GETTING MEDICATIONS?: NO

## 2025-03-26 ENCOUNTER — OFFICE VISIT (OUTPATIENT)
Age: 50
End: 2025-03-26
Payer: MEDICARE

## 2025-03-26 ENCOUNTER — HOSPITAL ENCOUNTER (OUTPATIENT)
Age: 50
Setting detail: SPECIMEN
Discharge: HOME | End: 2025-03-26

## 2025-03-26 VITALS
TEMPERATURE: 97.4 F | BODY MASS INDEX: 45.99 KG/M2 | SYSTOLIC BLOOD PRESSURE: 131 MMHG | DIASTOLIC BLOOD PRESSURE: 83 MMHG | RESPIRATION RATE: 18 BRPM | HEIGHT: 67 IN | WEIGHT: 293 LBS | HEART RATE: 80 BPM

## 2025-03-26 DIAGNOSIS — Z13.1 ENCOUNTER FOR SCREENING FOR DIABETES MELLITUS: ICD-10-CM

## 2025-03-26 DIAGNOSIS — Z87.891 FORMER SMOKER: ICD-10-CM

## 2025-03-26 DIAGNOSIS — E66.01 CLASS 3 SEVERE OBESITY DUE TO EXCESS CALORIES WITH BODY MASS INDEX (BMI) OF 40.0 TO 44.9 IN ADULT, UNSPECIFIED WHETHER SERIOUS COMORBIDITY PRESENT: ICD-10-CM

## 2025-03-26 DIAGNOSIS — R42 LIGHTHEADEDNESS: Primary | ICD-10-CM

## 2025-03-26 DIAGNOSIS — E66.813 CLASS 3 SEVERE OBESITY DUE TO EXCESS CALORIES WITH BODY MASS INDEX (BMI) OF 40.0 TO 44.9 IN ADULT, UNSPECIFIED WHETHER SERIOUS COMORBIDITY PRESENT: ICD-10-CM

## 2025-03-26 DIAGNOSIS — R42 LIGHTHEADEDNESS: ICD-10-CM

## 2025-03-26 DIAGNOSIS — R73.03 PREDIABETES: ICD-10-CM

## 2025-03-26 LAB
ANION GAP SERPL CALCULATED.3IONS-SCNC: 13 MMOL/L (ref 9–16)
BASOPHILS # BLD: <0.03 K/UL (ref 0–0.2)
BASOPHILS NFR BLD: 0 % (ref 0–2)
BUN SERPL-MCNC: 16 MG/DL (ref 6–20)
CALCIUM SERPL-MCNC: 9.7 MG/DL (ref 8.6–10.4)
CHLORIDE SERPL-SCNC: 104 MMOL/L (ref 98–107)
CHOLEST SERPL-MCNC: 175 MG/DL (ref 0–199)
CHOLESTEROL/HDL RATIO: 3.3
CO2 SERPL-SCNC: 25 MMOL/L (ref 20–31)
CREAT SERPL-MCNC: 0.7 MG/DL (ref 0.6–0.9)
EOSINOPHIL # BLD: 0.16 K/UL (ref 0–0.44)
EOSINOPHILS RELATIVE PERCENT: 3 % (ref 1–4)
ERYTHROCYTE [DISTWIDTH] IN BLOOD BY AUTOMATED COUNT: 14.5 % (ref 11.8–14.4)
GFR, ESTIMATED: >90 ML/MIN/1.73M2
GLUCOSE SERPL-MCNC: 93 MG/DL (ref 74–99)
HCT VFR BLD AUTO: 39.1 % (ref 36.3–47.1)
HDLC SERPL-MCNC: 53 MG/DL
HGB BLD-MCNC: 11.9 G/DL (ref 11.9–15.1)
IMM GRANULOCYTES # BLD AUTO: <0.03 K/UL (ref 0–0.3)
IMM GRANULOCYTES NFR BLD: 0 %
LDLC SERPL CALC-MCNC: 99 MG/DL (ref 0–100)
LYMPHOCYTES NFR BLD: 1.75 K/UL (ref 1.1–3.7)
LYMPHOCYTES RELATIVE PERCENT: 31 % (ref 24–43)
MAGNESIUM SERPL-MCNC: 2.1 MG/DL (ref 1.6–2.6)
MCH RBC QN AUTO: 28.1 PG (ref 25.2–33.5)
MCHC RBC AUTO-ENTMCNC: 30.4 G/DL (ref 28.4–34.8)
MCV RBC AUTO: 92.4 FL (ref 82.6–102.9)
MONOCYTES NFR BLD: 0.41 K/UL (ref 0.1–1.2)
MONOCYTES NFR BLD: 7 % (ref 3–12)
NEUTROPHILS NFR BLD: 59 % (ref 36–65)
NEUTS SEG NFR BLD: 3.3 K/UL (ref 1.5–8.1)
NRBC BLD-RTO: 0 PER 100 WBC
PLATELET # BLD AUTO: 251 K/UL (ref 138–453)
PMV BLD AUTO: 11.1 FL (ref 8.1–13.5)
POTASSIUM SERPL-SCNC: 4.2 MMOL/L (ref 3.7–5.3)
RBC # BLD AUTO: 4.23 M/UL (ref 3.95–5.11)
RBC # BLD: ABNORMAL 10*6/UL
SODIUM SERPL-SCNC: 142 MMOL/L (ref 136–145)
TRIGL SERPL-MCNC: 117 MG/DL
VLDLC SERPL CALC-MCNC: 23 MG/DL (ref 1–30)
WBC OTHER # BLD: 5.7 K/UL (ref 3.5–11.3)

## 2025-03-26 PROCEDURE — 99214 OFFICE O/P EST MOD 30 MIN: CPT | Performed by: FAMILY MEDICINE

## 2025-03-26 PROCEDURE — 3079F DIAST BP 80-89 MM HG: CPT | Performed by: FAMILY MEDICINE

## 2025-03-26 PROCEDURE — 3075F SYST BP GE 130 - 139MM HG: CPT | Performed by: FAMILY MEDICINE

## 2025-03-26 PROCEDURE — 99212 OFFICE O/P EST SF 10 MIN: CPT | Performed by: FAMILY MEDICINE

## 2025-03-26 RX ORDER — VENLAFAXINE HYDROCHLORIDE 150 MG/1
150 CAPSULE, EXTENDED RELEASE ORAL NIGHTLY
COMMUNITY

## 2025-03-26 RX ORDER — MECLIZINE HYDROCHLORIDE 25 MG/1
25 TABLET ORAL 3 TIMES DAILY PRN
Qty: 30 TABLET | Refills: 0 | Status: SHIPPED | OUTPATIENT
Start: 2025-03-26

## 2025-03-26 NOTE — PATIENT INSTRUCTIONS
Thank you for your visit today to the [facility]. It was our pleasure to provide the best possible care for you today.    As we discussed, please take note of the following:  - Have your labs drawn as soon as possible.  - Contact your psychiatric provider as soon as possible to see if they have any suggestions regarding medication changes.  - Return 2 weeks as needed if symptoms worsen or fail to improve.    Call the office at [phone] with any questions or concerns.

## 2025-03-26 NOTE — PROGRESS NOTES
[facility]  [address] [place] [address]  Phone: [phone]  Fax: [phone]      Date of Visit:  3/26/2025  Patient Name: Gracie Lopez   Patient :  1975     HPI:     Chief Complaint   Patient presents with    Other     Lightheaded at night when got up to use bathroom, did try Meclizine and is better...  Shaky today-       Gracie Lopez is a 50 y.o. female who presents today to discuss the above. Patient has been having episodes of lightheadedness at night that started a few days ago on Stef day. She felt lightheaded  during the day as well as , night, then Monday night. She feels somewhat lightheaded today as well. She has been taking Antivert daily to TID which seems to help. Upon clarification, patient's symptoms are more lightheadedness/near-syncopal rather than vertigo. Patient denies loss of consciousness/true syncope. Patient believes she drinks an adequate amount of water - 4 to 5 glasses daily. She reports adequate food and water intake today as well.    Patient currently takes oxybutynin 10 mg for OAB, and is also on gabapentin 100 mg BID and BuSpar 30 mg daily for anxiety. She has been on Lybalvi but will be switching tonight to olanzapine 10 mg only. Patient's psychiatric medications (including gabapentin) are managed by Cindy Louis at [place] [facility].    I personally reviewed the patient's past medical history, current medications, allergies, surgical history, family history and social history.  Updates were made as necessary.    REVIEW OF SYSTEMS      Review of Systems   Constitutional: Negative.  Negative for fever.   Respiratory: Negative.  Negative for shortness of breath.    Cardiovascular: Negative.  Negative for chest pain.   Gastrointestinal: Negative.  Negative for abdominal pain, diarrhea, nausea and vomiting.   Genitourinary: Negative.  Negative for difficulty urinating.   Neurological:

## 2025-03-27 LAB
EST. AVERAGE GLUCOSE BLD GHB EST-MCNC: 131 MG/DL
HBA1C MFR BLD: 6.2 % (ref 4–6)

## 2025-03-27 ASSESSMENT — ENCOUNTER SYMPTOMS
ABDOMINAL PAIN: 0
GASTROINTESTINAL NEGATIVE: 1
SHORTNESS OF BREATH: 0
DIARRHEA: 0
NAUSEA: 0
RESPIRATORY NEGATIVE: 1
VOMITING: 0

## 2025-03-27 ASSESSMENT — VISUAL ACUITY: OU: 1

## 2025-03-30 ENCOUNTER — PATIENT MESSAGE (OUTPATIENT)
Age: 50
End: 2025-03-30

## 2025-03-31 NOTE — TELEPHONE ENCOUNTER
Contacted patient via telephone.  Discussed her lab results.  Questions regarding A1c, cholesterol, RDW answered to patient's satisfaction.  Discussed with patient that her current statin dosage is appropriate given her ASCVD score and prediabetic status.  Discussed with patient that RDW is not a clinically significant marker in isolation, especially given that the rest of her CBC was within normal limits.    The 10-year ASCVD risk score (Karen SANTIAGO, et al., 2019) is: 1.9%    Values used to calculate the score:      Age: 50 years      Sex: Female      Is Non- : No      Diabetic: No      Tobacco smoker: No      Systolic Blood Pressure: 131 mmHg      Is BP treated: Yes      HDL Cholesterol: 53 mg/dL      Total Cholesterol: 175 mg/dL

## 2025-04-17 ENCOUNTER — HOSPITAL ENCOUNTER (OUTPATIENT)
Dept: MAMMOGRAPHY | Age: 50
Discharge: HOME | End: 2025-04-19
Payer: MEDICARE

## 2025-04-17 DIAGNOSIS — I10 PRIMARY HYPERTENSION: ICD-10-CM

## 2025-04-17 DIAGNOSIS — Z12.31 ENCOUNTER FOR SCREENING MAMMOGRAM FOR BREAST CANCER: ICD-10-CM

## 2025-04-17 PROCEDURE — 77063 BREAST TOMOSYNTHESIS BI: CPT

## 2025-04-17 RX ORDER — LOSARTAN POTASSIUM 50 MG/1
50 TABLET ORAL 2 TIMES DAILY
Qty: 180 TABLET | Refills: 3 | Status: SHIPPED | OUTPATIENT
Start: 2025-04-17

## 2025-04-18 RX ORDER — HYDROCHLOROTHIAZIDE 25 MG/1
25 TABLET ORAL EVERY MORNING
Qty: 90 TABLET | Refills: 3 | Status: SHIPPED | OUTPATIENT
Start: 2025-04-18

## 2025-04-18 RX ORDER — HYDROCHLOROTHIAZIDE 25 MG/1
25 TABLET ORAL EVERY MORNING
Qty: 90 TABLET | Refills: 3 | OUTPATIENT
Start: 2025-04-18

## 2025-05-01 RX ORDER — OXYBUTYNIN CHLORIDE 10 MG/1
10 TABLET, EXTENDED RELEASE ORAL DAILY
Qty: 90 TABLET | Refills: 1 | Status: SHIPPED | OUTPATIENT
Start: 2025-05-01

## 2025-06-18 ENCOUNTER — OFFICE VISIT (OUTPATIENT)
Age: 50
End: 2025-06-18
Payer: MEDICAID

## 2025-06-18 VITALS
RESPIRATION RATE: 16 BRPM | TEMPERATURE: 98.2 F | HEIGHT: 67 IN | DIASTOLIC BLOOD PRESSURE: 76 MMHG | BODY MASS INDEX: 43.47 KG/M2 | WEIGHT: 277 LBS | HEART RATE: 70 BPM | SYSTOLIC BLOOD PRESSURE: 135 MMHG

## 2025-06-18 DIAGNOSIS — K59.00 CONSTIPATION, UNSPECIFIED CONSTIPATION TYPE: Primary | ICD-10-CM

## 2025-06-18 PROCEDURE — 99212 OFFICE O/P EST SF 10 MIN: CPT | Performed by: FAMILY MEDICINE

## 2025-06-18 RX ORDER — TRAZODONE HYDROCHLORIDE 100 MG/1
TABLET ORAL
COMMUNITY
Start: 2025-06-16

## 2025-06-18 ASSESSMENT — ENCOUNTER SYMPTOMS
DIARRHEA: 0
NAUSEA: 0
VOMITING: 0
ABDOMINAL PAIN: 0
SHORTNESS OF BREATH: 0
CONSTIPATION: 1
RESPIRATORY NEGATIVE: 1
BLOOD IN STOOL: 0

## 2025-06-18 ASSESSMENT — VISUAL ACUITY: OU: 1

## 2025-06-18 NOTE — PATIENT INSTRUCTIONS
Thank you for your visit today to the [facility]. It was our pleasure to provide the best possible care for you today.    As we discussed, please take note of the following:  - You can take Dulcolax daily for up to one week.  - You can also start taking Miralax, 17 g/one capful daily for up to 2 weeks. Be aware it might not have an immediate effect; it usually produces a BM in 2-3 days.  - If your symptoms do not improve within 2 weeks, or if you develop worsening symptoms such as not passing gas, not having bowel movements, abdominal pain, nausea/vomiting, please call the office and let us know immediately.  - No follow-ups on file.    Call the office at [phone] with any questions or concerns.

## 2025-06-18 NOTE — PROGRESS NOTES
[facility]  [address] [place] [address]  Phone: [phone]  Fax: [phone]      Date of Visit:  2025  Patient Name: Gracie Lopez   Patient :  1975     HPI:     Chief Complaint   Patient presents with    Other     Constipation x 10 days, tried laxative 1 time, going small amounts, tried to adjust diet       Gracie Lopez is a 50 y.o. female who presents today to discuss the above.    Patient states that for the past couple of weeks, she went almost 10 days without having a BM. She took a laxative (Dulcolax), but only once; it did help. She is now having small BMs daily but it is not very much, and she does not feel she is having a full BM. No blood or other abnormalities of the stool. She is still passing gas. She was reluctant to take the Dulcolax more than once because she was unsure if she should. Patient denies any history of bowel obstructions or other GI issues.  She has tried eating more fiber.    Patient does note some recent stress in her life. She states that her mental health provider (Cindy Louis) stopped her olanzapine at the end of May 2025 and since then she has had trouble sleeping. She is now taking trazodone instead.    I personally reviewed the patient's past medical history, current medications, allergies, surgical history, family history and social history.  Updates were made as necessary.    REVIEW OF SYSTEMS      Review of Systems   Constitutional: Negative.  Negative for fever.   Respiratory: Negative.  Negative for shortness of breath.    Cardiovascular: Negative.  Negative for chest pain.   Gastrointestinal:  Positive for constipation. Negative for abdominal pain, blood in stool, diarrhea, nausea and vomiting.   Genitourinary: Negative.  Negative for difficulty urinating and dysuria.   Neurological: Negative.    All other systems reviewed and are negative.      REVIEWED INFORMATION